# Patient Record
Sex: MALE | Race: WHITE | NOT HISPANIC OR LATINO | Employment: FULL TIME | URBAN - METROPOLITAN AREA
[De-identification: names, ages, dates, MRNs, and addresses within clinical notes are randomized per-mention and may not be internally consistent; named-entity substitution may affect disease eponyms.]

---

## 2023-03-16 LAB — ANTI-NUCLEAR ANTIBODY (ANA): NEGATIVE

## 2023-09-14 PROBLEM — H51.0 PALSY OF CONJUGATE GAZE: Status: ACTIVE | Noted: 2023-09-14

## 2023-09-14 PROBLEM — Z95.5 PRESENCE OF STENT IN CORONARY ARTERY: Status: ACTIVE | Noted: 2023-09-14

## 2023-09-14 PROBLEM — R13.10 DYSPHAGIA: Status: ACTIVE | Noted: 2023-09-14

## 2023-09-14 PROBLEM — R50.9 FEVER: Status: ACTIVE | Noted: 2023-09-14

## 2023-09-14 PROBLEM — H25.10 NUCLEAR SCLEROSIS: Status: ACTIVE | Noted: 2023-09-14

## 2023-09-14 PROBLEM — G93.89 CEREBRAL VENTRICULOMEGALY: Status: ACTIVE | Noted: 2023-09-14

## 2023-09-14 PROBLEM — G47.33 OSA (OBSTRUCTIVE SLEEP APNEA): Status: ACTIVE | Noted: 2023-09-14

## 2023-09-14 PROBLEM — R49.0 DYSPHONIA: Status: ACTIVE | Noted: 2023-09-14

## 2023-09-14 PROBLEM — R00.1 SINUS BRADYCARDIA: Status: ACTIVE | Noted: 2023-09-14

## 2023-09-14 PROBLEM — K11.7 SIALORRHEA: Status: ACTIVE | Noted: 2023-09-14

## 2023-09-14 PROBLEM — G95.9 CERVICAL MYELOPATHY (MULTI): Status: ACTIVE | Noted: 2023-09-14

## 2023-09-14 PROBLEM — I10 BENIGN HYPERTENSION: Status: ACTIVE | Noted: 2023-09-14

## 2023-09-14 PROBLEM — I25.10 CORONARY ARTERY DISEASE: Status: ACTIVE | Noted: 2023-09-14

## 2023-09-14 PROBLEM — R06.83 SNORING: Status: ACTIVE | Noted: 2023-09-14

## 2023-09-14 PROBLEM — G24.4 ORAL DYSKINESIA: Status: ACTIVE | Noted: 2023-09-14

## 2023-09-14 PROBLEM — E53.8 VITAMIN B12 DEFICIENCY: Status: ACTIVE | Noted: 2023-09-14

## 2023-09-14 PROBLEM — G91.9 HYDROCEPHALUS (MULTI): Status: ACTIVE | Noted: 2023-09-14

## 2023-09-14 PROBLEM — R09.89 RUNNY NOSE: Status: ACTIVE | Noted: 2023-09-14

## 2023-09-14 PROBLEM — R20.2 PARESTHESIA OF BOTH FEET: Status: ACTIVE | Noted: 2023-09-14

## 2023-09-14 PROBLEM — R26.81 UNSTEADY GAIT: Status: ACTIVE | Noted: 2023-09-14

## 2023-09-14 PROBLEM — R51.9 HEADACHE: Status: ACTIVE | Noted: 2023-09-14

## 2023-09-14 PROBLEM — R07.89 CHEST TIGHTNESS: Status: ACTIVE | Noted: 2023-09-14

## 2023-09-14 PROBLEM — G20.C PARKINSONISM (MULTI): Status: ACTIVE | Noted: 2023-09-14

## 2023-09-14 PROBLEM — E78.5 HYPERLIPIDEMIA: Status: ACTIVE | Noted: 2023-09-14

## 2023-09-14 PROBLEM — G47.19 EXCESSIVE DAYTIME SLEEPINESS: Status: ACTIVE | Noted: 2023-09-14

## 2023-09-14 RX ORDER — TRIAMCINOLONE ACETONIDE 1 MG/G
CREAM TOPICAL 2 TIMES DAILY
COMMUNITY
End: 2024-02-21

## 2023-09-14 RX ORDER — METOPROLOL SUCCINATE 50 MG/1
1 TABLET, EXTENDED RELEASE ORAL DAILY
COMMUNITY
Start: 2021-11-16 | End: 2024-02-21

## 2023-09-14 RX ORDER — METFORMIN HYDROCHLORIDE 500 MG/1
500 TABLET ORAL
COMMUNITY

## 2023-09-14 RX ORDER — MIRABEGRON 50 MG/1
1 TABLET, EXTENDED RELEASE ORAL DAILY
COMMUNITY
End: 2024-02-21

## 2023-09-14 RX ORDER — AMANTADINE 137 MG/1
CAPSULE, COATED PELLETS ORAL DAILY
COMMUNITY
Start: 2022-07-21 | End: 2024-04-23 | Stop reason: SDUPTHER

## 2023-09-14 RX ORDER — ATORVASTATIN CALCIUM 20 MG/1
1 TABLET, FILM COATED ORAL DAILY
COMMUNITY
Start: 2019-01-21

## 2023-09-14 RX ORDER — TRIAMTERENE/HYDROCHLOROTHIAZID 37.5-25 MG
TABLET ORAL
COMMUNITY
Start: 2018-11-16 | End: 2024-02-21

## 2023-09-14 RX ORDER — FOLIC ACID 1 MG/1
1 TABLET ORAL DAILY
COMMUNITY
End: 2024-03-21 | Stop reason: SDUPTHER

## 2023-09-14 RX ORDER — LOSARTAN POTASSIUM 50 MG/1
50 TABLET ORAL DAILY
COMMUNITY
End: 2024-05-21 | Stop reason: SDUPTHER

## 2023-09-14 RX ORDER — PREGABALIN 75 MG/1
CAPSULE ORAL 2 TIMES DAILY
COMMUNITY
Start: 2020-10-13

## 2023-09-14 RX ORDER — CYANOCOBALAMIN 1000 UG/ML
INJECTION, SOLUTION INTRAMUSCULAR; SUBCUTANEOUS
COMMUNITY

## 2023-09-14 RX ORDER — ONDANSETRON 4 MG/1
4 TABLET, ORALLY DISINTEGRATING ORAL EVERY 8 HOURS PRN
COMMUNITY

## 2023-09-14 RX ORDER — CARBIDOPA AND LEVODOPA 25; 100 MG/1; MG/1
TABLET ORAL
COMMUNITY
Start: 2021-06-08 | End: 2023-10-17 | Stop reason: ALTCHOICE

## 2023-10-17 ENCOUNTER — OFFICE VISIT (OUTPATIENT)
Dept: NEUROLOGY | Facility: CLINIC | Age: 63
End: 2023-10-17
Payer: COMMERCIAL

## 2023-10-17 VITALS
SYSTOLIC BLOOD PRESSURE: 134 MMHG | RESPIRATION RATE: 18 BRPM | TEMPERATURE: 98.2 F | WEIGHT: 168.2 LBS | HEIGHT: 69 IN | DIASTOLIC BLOOD PRESSURE: 83 MMHG | HEART RATE: 64 BPM | BODY MASS INDEX: 24.91 KG/M2

## 2023-10-17 DIAGNOSIS — F51.04 CHRONIC INSOMNIA: ICD-10-CM

## 2023-10-17 DIAGNOSIS — G20.C PRIMARY PARKINSONISM (MULTI): Primary | ICD-10-CM

## 2023-10-17 DIAGNOSIS — G91.2 IDIOPATHIC NORMAL PRESSURE HYDROCEPHALUS (MULTI): ICD-10-CM

## 2023-10-17 PROCEDURE — 3075F SYST BP GE 130 - 139MM HG: CPT | Performed by: PSYCHIATRY & NEUROLOGY

## 2023-10-17 PROCEDURE — 99214 OFFICE O/P EST MOD 30 MIN: CPT | Performed by: PSYCHIATRY & NEUROLOGY

## 2023-10-17 PROCEDURE — 1036F TOBACCO NON-USER: CPT | Performed by: PSYCHIATRY & NEUROLOGY

## 2023-10-17 PROCEDURE — 3079F DIAST BP 80-89 MM HG: CPT | Performed by: PSYCHIATRY & NEUROLOGY

## 2023-10-17 RX ORDER — MIRTAZAPINE 7.5 MG/1
7.5 TABLET, FILM COATED ORAL NIGHTLY
Qty: 30 TABLET | Refills: 0 | Status: SHIPPED | OUTPATIENT
Start: 2023-10-17 | End: 2023-11-14

## 2023-10-17 ASSESSMENT — ENCOUNTER SYMPTOMS
FEVER: 0
HEADACHES: 0
EYE PAIN: 0
HALLUCINATIONS: 0

## 2023-10-17 NOTE — PROGRESS NOTES
Subjective     Delfino Newberry is a 63 y.o. year old male here for PD and NPH follow up.     HPI  MRI brain showed worsening gliosis near his  shunt. HE is having nausea and periodic HAs.      Ropinirole was stopped due to a rash - that did not go away. Needed to see dermatologist after and put on a   Rytary 95mg 3 caps 6am/ 3 caps at noon/ 3 caps at 6pm. - gets at [facility].   He feels it helps a lot with his tongue dyskinesia and movement and speech.   Rarely notice it now.      Due to oral / tongue dyskinesia we stopped Sinemet (even on 1/2 tab TID) and started Ropinirole but the oral movements did not go away. He states he had a rash x 2 weeks, Ropinirole .   he has neuropathy - on Lyrica for this.  He gets b12 injections.    Gocovri 37mg 2 caps at night - helped dyskinesia. He has nausea.   He feels sinemet is not helping his movements.   past- he tried Amantadine - did not help. he tried 100mg once daily caused stomach issues.        He was on Sinemet 1 tab TID - initially had great response. then tried 1 tab QID no change in movement or his tongue dyskinesia. now taking 1/2 tab QID - no difference.      PMH complicated with HTN, DM2, CAD (on ASA daily LD 6/1), polyneuropathy, B12 deficiency, NPH (s/p RF VPS 6/2020w/ improvement, presented to [facility] obtunded 12/2020, s/p EVD placement, prolonged   hospital course, s/p RF VPS replacement- managed with Dr. De Jesus- seen by him few weeks ago and increased flow to the  shunt.     wife thinks the speech changes occurred after December and his shunt complication.      has tremors for since early 2020. occur at rest. L more than R hand.   admitted to [facility] for persistent confusion, drooling, and dysphagia.     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   seen by Ophthalmology-Dr. Heraclio Delatorre and ophtho fellow last week.       Neurology  resident and  evaluated him at hospital and recommended MOCA and SLP   evaluation and routine EEG. EEG was unremarkable.       neuropsych testing April 2019-showed a mixed picture. He did not have dramatic improvement after large volume LP. on ddx was LBD vs. FTLD.   wife states he has a flat affect but memory is pretty stable. walking is okay. no falls.   most noticeable recent changes are the speech and swallowing changes.      He first saw Dr. Alcazar in July 2018 and Feb 2019 for ELAINE and then neuropathy, had brain imaging then went to [facility] for evaluation for NPH.     MRI brain june 1 2021 showed repositioned R  shunt, showed aqueductal stenosis.       December 2019 he became obtunded /lethargic and was admitted to [facility] where he was found to have a shunt failure. He had a revision surgery and was discharged with gradual improvements and ability to ambulate independently but states that cognitively he never returned to baseline.   Since, he has had a slowly progressive decline in his ability to swallow. His wife states that he coughs after he eats and drinks, his voice is softer, having a lot of drooling      FH: mother had dementia in her 80s.   Review of Systems   Constitutional:  Negative for fever.   HENT:  Negative for ear pain.    Eyes:  Negative for pain.   Neurological:  Negative for syncope and headaches.   Psychiatric/Behavioral:  Negative for hallucinations and self-injury.    All other systems reviewed and are negative.    Patient Active Problem List   Diagnosis    Benign hypertension    Cerebral ventriculomegaly    Hydrocephalus (CMS/HCC)    Cervical myelopathy (CMS/HCC)    Chest tightness    Coronary artery disease    Dysphagia    Dysphonia    Nuclear sclerosis    Hyperlipidemia    Palsy of conjugate gaze    Paresthesia of both feet    Parkinsonism    Presence of stent in coronary artery    Sialorrhea    Excessive daytime sleepiness    Fever    Headache    ELAINE (obstructive sleep apnea)    Runny  nose    Sinus bradycardia    Snoring    Unsteady gait    Vitamin B12 deficiency    Oral dyskinesia     Past Medical History:   Diagnosis Date    (Idiopathic) normal pressure hydrocephalus (CMS/[facility]) 07/17/2021    NPH (normal pressure hydrocephalus)    Atherosclerotic heart disease of native coronary artery without angina pectoris 06/20/2022    Coronary artery disease    Disease of spinal cord, unspecified (CMS/[facility]) 07/15/2021    Cervical myelopathy    Obstructive sleep apnea (adult) (pediatric) 07/15/2021    Obstructive sleep apnea, adult    Obstructive sleep apnea (adult) (pediatric) 02/07/2019    ELAINE (obstructive sleep apnea)    Other hyperlipidemia 07/15/2021    Other hyperlipidemia    Parkinson's disease (CMS/[facility]) 11/29/2022    Parkinsonism    Personal history of other diseases of the circulatory system 07/15/2021    History of coronary artery disease    Personal history of other diseases of the circulatory system 07/15/2021    History of hypertension    Personal history of other diseases of the circulatory system     History of abnormal electrocardiography    Personal history of other diseases of the musculoskeletal system and connective tissue     History of backache    Personal history of other endocrine, nutritional and metabolic disease 07/15/2021    History of type 2 diabetes mellitus    Personal history of other specified conditions 06/01/2018    History of snoring    Personal history of other specified conditions 07/15/2021    History of chest pain     Past Surgical History:   Procedure Laterality Date    CARDIAC CATHETERIZATION  07/17/2021    Cardiac Cath Procedure Outcome:    OTHER SURGICAL HISTORY  07/17/2021    Ventriculoperitoneal shunt creation     Social History     Tobacco Use    Smoking status: Not on file    Smokeless tobacco: Not on file   Substance Use Topics    Alcohol use: Not on file     family history includes Coronary artery disease in his mother; Stroke in his father.    Current  Outpatient Medications:     amantadine HCL (Gocovri) 137 mg capsule,extended release 24hr, Take by mouth. Take per directed, Disp: , Rfl:     atorvastatin (Lipitor) 20 mg tablet, Take 1 tablet (20 mg) by mouth once daily., Disp: , Rfl:     carbidopa-levodopa (Sinemet)  mg tablet, Take by mouth. Take per directed, Disp: , Rfl:     cyanocobalamin (Vitamin B-12) 1,000 mcg/mL injection, Take per directed, Disp: , Rfl:     folic acid (Folvite) 1 mg tablet, Take 1 tablet (1 mg) by mouth once daily., Disp: , Rfl:     losartan (Cozaar) 50 mg tablet, Take 1 tablet (50 mg) by mouth once daily., Disp: , Rfl:     metFORMIN (Glucophage) 500 mg tablet, Take 1 tablet (500 mg) by mouth 2 times a day with meals., Disp: , Rfl:     metoprolol succinate XL (Toprol-XL) 50 mg 24 hr tablet, Take 1 tablet (50 mg) by mouth once daily., Disp: , Rfl:     mirabegron (Myrbetriq) 50 mg tablet extended release 24 hr 24 hr tablet, Take 1 tablet (50 mg) by mouth once daily., Disp: , Rfl:     ondansetron ODT (Zofran-ODT) 4 mg disintegrating tablet, Take 1 tablet (4 mg) by mouth every 8 hours if needed for nausea or vomiting., Disp: , Rfl:     pregabalin (Lyrica) 75 mg capsule, Take by mouth twice a day. Take per directed, Disp: , Rfl:     triamcinolone (Kenalog) 0.1 % cream, Apply topically 2 times a day. Take per directed, Disp: , Rfl:     triamterene-hydrochlorothiazid (Maxzide-25) 37.5-25 mg tablet, Take by mouth. Take per directed, Disp: , Rfl:   No Known Allergies  There were no vitals taken for this visit.  Neurological Exam/Physical Exam:    tongue / mouth dyskinesia- mild   Constitutional: General appearance: no acute distress   Auscultation of Heart: Regular rate and rhythm, no murmurs, normal S1 and S2.   Carotid Arteries: Intact without any bruits   Peripheral Vascular Exam: Pulses +2 and equal in all extremities. No swelling, varicosities, edema or tenderness to palpations   Mental status: The patient was in no distress, alert,  interactive and cooperative. Affect is appropriate.   Orientation: oriented to person, oriented to place and oriented to time.   Memory: recent memory intact and remote memory intact.   Attention: normal attention span and normal concentrating ability.   Language: normal comprehension and no difficulty naming common objects.   Fund of knowledge: Patient displays adequate knowledge of current events, adequate fund of knowledge regarding past history and adequate fund of knowledge regarding vocabulary. spastic speech, hypophonic. somewhat nasal at times.   Eyes: The ophthalmoscopic examination was normal. The fundi are visualized with normal disc margins and without hemorrhages   Cranial nerve II: Visual fields full to confrontation.   Cranial nerves III, IV, and VI: Abnormal . unable to look upgaze.   Cranial Nerve V: Facial sensation intact bilaterally.   Cranial nerve VII: Normal and symmetric facial strength.   Cranial nerve VIII: Hearing is intact bilaterally to finger rub / whisper.   Cranial nerves IX and X: Palate elevates symmetrically.   Cranial nerve XI: Shoulder shrug and neck rotation strength are intact.   Cranial nerve XII: Tongue midline with normal strength.   Motor: Motor exam was normal. Muscle bulk was normal in both upper and lower extremities. Abnormal. Muscle strength was 5/5 throughout. Abnormal. tongue / mouth dyskinesia. tremors of R hand at rest at times.. mild bradykinesia. mild rigidity in L >R side.   Deep Tendon Reflexes: left biceps 2+ , right biceps 2+, left triceps 2+, right triceps 2+, left brachioradialis 3+, right brachioradialis 3+, left patella 3+, right patella 3+, left ankle jerk 3+, right ankle jerk 3+   Plantar Reflex: Toes downgoing to plantar stimulation on the left. Toes downgoing to plantar stimulation on the right.   Sensory Exam: Sensory Exam was abnormal. dec vibration in feet b/l.   Coordination: There is no limb dystaxia and rapid alternating movements are intact.  "  Gait: Abnormal . mildly wide based gait.        Labs:  No components found for: \"THRYOIDSTIMU\"  CBC:   Lab Results   Component Value Date    WBC 5.3 06/01/2021    HGB 16.1 06/01/2021    HCT 44.5 06/01/2021     06/01/2021     BMP:   Lab Results   Component Value Date     (L) 06/01/2021    K 3.3 (L) 06/01/2021    CL 95 (L) 06/01/2021    CO2 26 06/01/2021    BUN 16 06/01/2021    CREATININE 1.19 06/01/2021    CALCIUM 10.1 06/01/2021     LFT:   Lab Results   Component Value Date    ALKPHOS 69 12/03/2020    BILITOT 1.0 12/03/2020    PROT 7.1 12/03/2020    ALBUMIN 4.6 12/03/2020    ALT 28 12/03/2020    AST 21 12/03/2020         Assessment/Plan   Problem List Items Addressed This Visit             ICD-10-CM    Hydrocephalus (CMS/[facility]) G91.9    Parkinsonism - Primary G20.C    with NPH, parkinsonism, speech/bulbar symptoms 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      he failed amantadine - could not tolerate.   rash was NOT due to ropinirole.  ? Never figured that out.   NPH - followed by NSGY.  c/w rytary 95mg 3 caps TID.  And gocovri.   Try remeron 7.5mg at bedtime for insomnia.    "

## 2023-10-17 NOTE — PATIENT INSTRUCTIONS
"It was a pleasure seeing you today.     Mirtazepine 7.5mg at bedtime for sleep ( can help anxiety)- try it for 1 month and see if it helps.     In general, the following behavioral strategies and environmental modifications can help improve duration and quality of sleep:   a. Set a fixed bedtime and waking time every day.   b. Avoid napping during the day.    c. Avoid alcohol, caffeine and heavy, spicy, or sugary foods 4-6 hours before bedtime.    d. Exercise regularly, but not right before going to bed.   e. Block out all distractions by turning off - or even removing from the room - electronic devices such as TV, computer, phone, video player, audio player, prakash device, etc.   f. Use comfortable bedding, set a comfortable temperature, and keep the room well ventilated.   g. Do not use the bed as an office, workroom, or recreation room.    h. Establish a pre-sleep ritual, such as a warm bath or a few minutes of reading.   i. If prone to anxiety, relaxation techniques such as yoga and deep breathing can be helpful.    Please make a follow up appointment in 6 months.     For any urgent issues or needing to speak to a medical assistant please call [phone], option 6 during our office hours Monday-Friday 8am-4pm, and leave a voicemail with your concern.  My office will try to reach back you as soon as possible within 24 (business) hours.  If you have an emergency please call 911 or visit a local urgent care or nearest emergency room.      Please understand that [employer] is a useful communication tool for simple \"normal\" results or a refill request but I would not recommend using this tool for emergent or urgent issues or for conversations with me.  I am happy to ask my staff to rearrange a follow up visit or a virtual visit sooner than requested if appropriate for your care.       "

## 2023-11-14 DIAGNOSIS — F51.04 CHRONIC INSOMNIA: ICD-10-CM

## 2023-11-14 RX ORDER — MIRTAZAPINE 7.5 MG/1
7.5 TABLET, FILM COATED ORAL NIGHTLY
Qty: 30 TABLET | Refills: 0 | Status: SHIPPED | OUTPATIENT
Start: 2023-11-14 | End: 2023-12-18

## 2023-12-17 DIAGNOSIS — F51.04 CHRONIC INSOMNIA: ICD-10-CM

## 2023-12-18 RX ORDER — MIRTAZAPINE 7.5 MG/1
7.5 TABLET, FILM COATED ORAL NIGHTLY
Qty: 30 TABLET | Refills: 0 | Status: SHIPPED | OUTPATIENT
Start: 2023-12-18 | End: 2024-02-21 | Stop reason: WASHOUT

## 2024-01-18 ENCOUNTER — TELEPHONE (OUTPATIENT)
Dept: PRIMARY CARE | Facility: CLINIC | Age: 64
End: 2024-01-18

## 2024-01-18 NOTE — TELEPHONE ENCOUNTER
Pt. Called & said Dr. Hedrick -he's his brother in law & said Dr. Hedrick said he could get in  With you.  Please advise.  Ty

## 2024-02-21 ENCOUNTER — OFFICE VISIT (OUTPATIENT)
Dept: PRIMARY CARE | Facility: CLINIC | Age: 64
End: 2024-02-21
Payer: COMMERCIAL

## 2024-02-21 VITALS
BODY MASS INDEX: 24.82 KG/M2 | OXYGEN SATURATION: 96 % | DIASTOLIC BLOOD PRESSURE: 74 MMHG | SYSTOLIC BLOOD PRESSURE: 124 MMHG | TEMPERATURE: 98.6 F | HEIGHT: 69 IN | WEIGHT: 167.6 LBS | HEART RATE: 71 BPM

## 2024-02-21 DIAGNOSIS — E55.9 VITAMIN D DEFICIENCY: ICD-10-CM

## 2024-02-21 DIAGNOSIS — E78.5 HYPERLIPIDEMIA, UNSPECIFIED HYPERLIPIDEMIA TYPE: ICD-10-CM

## 2024-02-21 DIAGNOSIS — R73.9 HYPERGLYCEMIA: ICD-10-CM

## 2024-02-21 DIAGNOSIS — Z79.899 HIGH RISK MEDICATION USE: ICD-10-CM

## 2024-02-21 DIAGNOSIS — Z11.59 ENCOUNTER FOR HEPATITIS C SCREENING TEST FOR LOW RISK PATIENT: ICD-10-CM

## 2024-02-21 DIAGNOSIS — Z00.00 ANNUAL PHYSICAL EXAM: Primary | ICD-10-CM

## 2024-02-21 DIAGNOSIS — E11.9 CONTROLLED TYPE 2 DIABETES MELLITUS WITHOUT COMPLICATION, WITHOUT LONG-TERM CURRENT USE OF INSULIN (MULTI): ICD-10-CM

## 2024-02-21 DIAGNOSIS — Z11.4 ENCOUNTER FOR SCREENING FOR HIV: ICD-10-CM

## 2024-02-21 DIAGNOSIS — I10 BENIGN ESSENTIAL HYPERTENSION: ICD-10-CM

## 2024-02-21 PROCEDURE — 3078F DIAST BP <80 MM HG: CPT | Performed by: FAMILY MEDICINE

## 2024-02-21 PROCEDURE — 99386 PREV VISIT NEW AGE 40-64: CPT | Performed by: FAMILY MEDICINE

## 2024-02-21 PROCEDURE — 90670 PCV13 VACCINE IM: CPT | Performed by: FAMILY MEDICINE

## 2024-02-21 PROCEDURE — 90750 HZV VACC RECOMBINANT IM: CPT | Performed by: FAMILY MEDICINE

## 2024-02-21 PROCEDURE — 90472 IMMUNIZATION ADMIN EACH ADD: CPT | Performed by: FAMILY MEDICINE

## 2024-02-21 PROCEDURE — 1036F TOBACCO NON-USER: CPT | Performed by: FAMILY MEDICINE

## 2024-02-21 PROCEDURE — 4010F ACE/ARB THERAPY RXD/TAKEN: CPT | Performed by: FAMILY MEDICINE

## 2024-02-21 PROCEDURE — 3074F SYST BP LT 130 MM HG: CPT | Performed by: FAMILY MEDICINE

## 2024-02-21 PROCEDURE — 90471 IMMUNIZATION ADMIN: CPT | Performed by: FAMILY MEDICINE

## 2024-02-21 RX ORDER — ASPIRIN 81 MG/1
81 TABLET ORAL DAILY
COMMUNITY

## 2024-02-21 RX ORDER — METOPROLOL SUCCINATE 25 MG/1
25 TABLET, EXTENDED RELEASE ORAL DAILY
COMMUNITY
Start: 2024-01-24

## 2024-02-21 RX ORDER — LEVODOPA AND CARBIDOPA 95; 23.75 MG/1; MG/1
3 CAPSULE, EXTENDED RELEASE ORAL 3 TIMES DAILY
COMMUNITY

## 2024-02-21 SDOH — HEALTH STABILITY: PHYSICAL HEALTH: ON AVERAGE, HOW MANY MINUTES DO YOU ENGAGE IN EXERCISE AT THIS LEVEL?: 100 MIN

## 2024-02-21 SDOH — HEALTH STABILITY: PHYSICAL HEALTH: ON AVERAGE, HOW MANY DAYS PER WEEK DO YOU ENGAGE IN MODERATE TO STRENUOUS EXERCISE (LIKE A BRISK WALK)?: 5 DAYS

## 2024-02-21 ASSESSMENT — PAIN SCALES - GENERAL: PAINLEVEL: 0-NO PAIN

## 2024-02-21 ASSESSMENT — ENCOUNTER SYMPTOMS: DEPRESSION: 0

## 2024-02-21 NOTE — PROGRESS NOTES
"Subjective   Patient ID: Delfino Newberry is a 63 y.o. male who presents for Annual Exam (Annual physical exam, pt is not fasting. ) and New Patient Visit (Establish care with pcp. ).    HPI   Patient here to establish and get an annual physical.  Significant past history for NPH with shunt, Parkinson's, CAD with stent as well as hypertension and hyperlipidemia.  Patient also on home IM injections of B12 for deficiency.  Lives in [place].  .  3 KIDS. 2 GRANDKIDS.  Works at [employer].  Walks and is physical all dAY  vIT b12   Review of Systems  Cardiovascular: no chest pain, no edema, no palpitations, and no syncope.   Respiratory: no cough,no shortness of breath, and no wheezing  Gastrointestinal: no abdominal pain but gets cramps, no nausea, vomiting or blood in stools  Genitourinary: no dysuria or hematuria    Objective   /74 (BP Location: Left arm, Patient Position: Sitting, BP Cuff Size: Adult)   Pulse 71   Temp 37 °C (98.6 °F) (Temporal)   Ht 1.753 m (5' 9\")   Wt 76 kg (167 lb 9.6 oz)   SpO2 96%   BMI 24.75 kg/m²     Physical Exam  Alert, pleasant and in no acute distress.  Neck: Supple, no JVD or carotid bruit  Heart: Regular rate and rhythm, no murmur  Lungs: Clear to auscultation  Lower extremities: No edema  Abdomen: Soft, nontender without palpable mass    Assessment/Plan   Problem List Items Addressed This Visit             ICD-10-CM       Cardiac and Vasculature    Hyperlipidemia E78.5    Relevant Orders    Lipid Panel     Other Visit Diagnoses         Codes    Annual physical exam    -  Primary Z00.00    Relevant Orders    Comprehensive Metabolic Panel    CBC    Lipid Panel    Vitamin D 25-Hydroxy,Total (for eval of Vitamin D levels)    Hemoglobin A1C    HIV 1/2 Antigen/Antibody Screen with Reflex to Confirmation    Hepatitis C antibody    Controlled type 2 diabetes mellitus without complication, without long-term current use of insulin (CMS/[facility])     E11.9    Benign essential " hypertension     I10    Relevant Orders    Comprehensive Metabolic Panel    CBC    Hyperglycemia     R73.9    Relevant Orders    Comprehensive Metabolic Panel    CBC    Hemoglobin A1C    Vitamin D deficiency     E55.9    Relevant Orders    Vitamin D 25-Hydroxy,Total (for eval of Vitamin D levels)    Encounter for hepatitis C screening test for low risk patient     Z11.59    Relevant Orders    Hepatitis C antibody    High risk medication use     Z79.899    Relevant Orders    Comprehensive Metabolic Panel    CBC    Encounter for screening for HIV     Z11.4    Relevant Orders    HIV 1/2 Antigen/Antibody Screen with Reflex to Confirmation        1.  Health maintenance: Care gaps addressed.  Shingles and pneumonia vaccines provided.  2.  Diabetes mellitus type 2:  Decrease metformin to 1/2 tab 2 times daily to see if this helps abdominal cramps.  Will plan to recheck an A1c in 3 to 4 months.  3.  Hypertension/hyperlipidemia: We will get routine labs.  Blood pressure under good control  4.  Vitamin D deficiency: We will recheck vitamin D  Follow-up in 3 to 4 months.  Call with any problems.

## 2024-02-24 ENCOUNTER — LAB (OUTPATIENT)
Dept: LAB | Facility: LAB | Age: 64
End: 2024-02-24
Payer: COMMERCIAL

## 2024-02-24 DIAGNOSIS — R73.9 HYPERGLYCEMIA: ICD-10-CM

## 2024-02-24 DIAGNOSIS — Z11.4 ENCOUNTER FOR SCREENING FOR HIV: ICD-10-CM

## 2024-02-24 DIAGNOSIS — Z11.59 ENCOUNTER FOR HEPATITIS C SCREENING TEST FOR LOW RISK PATIENT: ICD-10-CM

## 2024-02-24 DIAGNOSIS — E78.5 HYPERLIPIDEMIA, UNSPECIFIED HYPERLIPIDEMIA TYPE: ICD-10-CM

## 2024-02-24 DIAGNOSIS — Z00.00 ANNUAL PHYSICAL EXAM: ICD-10-CM

## 2024-02-24 DIAGNOSIS — Z79.899 HIGH RISK MEDICATION USE: ICD-10-CM

## 2024-02-24 DIAGNOSIS — I10 BENIGN ESSENTIAL HYPERTENSION: ICD-10-CM

## 2024-02-24 DIAGNOSIS — D64.9 ANEMIA, UNSPECIFIED TYPE: ICD-10-CM

## 2024-02-24 DIAGNOSIS — E55.9 VITAMIN D DEFICIENCY: ICD-10-CM

## 2024-02-24 LAB
25(OH)D3 SERPL-MCNC: 21 NG/ML (ref 30–100)
ALBUMIN SERPL BCP-MCNC: 4 G/DL (ref 3.4–5)
ALP SERPL-CCNC: 80 U/L (ref 33–136)
ALT SERPL W P-5'-P-CCNC: 12 U/L (ref 10–52)
ANION GAP SERPL CALC-SCNC: 12 MMOL/L (ref 10–20)
AST SERPL W P-5'-P-CCNC: 17 U/L (ref 9–39)
BILIRUB SERPL-MCNC: 0.8 MG/DL (ref 0–1.2)
BUN SERPL-MCNC: 20 MG/DL (ref 6–23)
CALCIUM SERPL-MCNC: 9.1 MG/DL (ref 8.6–10.3)
CHLORIDE SERPL-SCNC: 104 MMOL/L (ref 98–107)
CHOLEST SERPL-MCNC: 100 MG/DL (ref 0–199)
CHOLESTEROL/HDL RATIO: 2.6
CO2 SERPL-SCNC: 30 MMOL/L (ref 21–32)
CREAT SERPL-MCNC: 1.29 MG/DL (ref 0.5–1.3)
EGFRCR SERPLBLD CKD-EPI 2021: 62 ML/MIN/1.73M*2
ERYTHROCYTE [DISTWIDTH] IN BLOOD BY AUTOMATED COUNT: 12.2 % (ref 11.5–14.5)
EST. AVERAGE GLUCOSE BLD GHB EST-MCNC: 111 MG/DL
GLUCOSE SERPL-MCNC: 108 MG/DL (ref 74–99)
HBA1C MFR BLD: 5.5 %
HCT VFR BLD AUTO: 36 % (ref 41–52)
HCV AB SER QL: NONREACTIVE
HDLC SERPL-MCNC: 38.4 MG/DL
HGB BLD-MCNC: 12 G/DL (ref 13.5–17.5)
HIV 1+2 AB+HIV1 P24 AG SERPL QL IA: NONREACTIVE
LDLC SERPL CALC-MCNC: 52 MG/DL
MCH RBC QN AUTO: 31.1 PG (ref 26–34)
MCHC RBC AUTO-ENTMCNC: 33.3 G/DL (ref 32–36)
MCV RBC AUTO: 93 FL (ref 80–100)
NON HDL CHOLESTEROL: 62 MG/DL (ref 0–149)
NRBC BLD-RTO: 0 /100 WBCS (ref 0–0)
PLATELET # BLD AUTO: 265 X10*3/UL (ref 150–450)
POTASSIUM SERPL-SCNC: 4.5 MMOL/L (ref 3.5–5.3)
PROT SERPL-MCNC: 6.2 G/DL (ref 6.4–8.2)
RBC # BLD AUTO: 3.86 X10*6/UL (ref 4.5–5.9)
SODIUM SERPL-SCNC: 141 MMOL/L (ref 136–145)
TRIGL SERPL-MCNC: 46 MG/DL (ref 0–149)
VLDL: 9 MG/DL (ref 0–40)
WBC # BLD AUTO: 3.1 X10*3/UL (ref 4.4–11.3)

## 2024-02-24 PROCEDURE — 82746 ASSAY OF FOLIC ACID SERUM: CPT

## 2024-02-24 PROCEDURE — 80061 LIPID PANEL: CPT

## 2024-02-24 PROCEDURE — 36415 COLL VENOUS BLD VENIPUNCTURE: CPT

## 2024-02-24 PROCEDURE — 83036 HEMOGLOBIN GLYCOSYLATED A1C: CPT

## 2024-02-24 PROCEDURE — 82728 ASSAY OF FERRITIN: CPT

## 2024-02-24 PROCEDURE — 83550 IRON BINDING TEST: CPT

## 2024-02-24 PROCEDURE — 82306 VITAMIN D 25 HYDROXY: CPT

## 2024-02-24 PROCEDURE — 85027 COMPLETE CBC AUTOMATED: CPT

## 2024-02-24 PROCEDURE — 82607 VITAMIN B-12: CPT

## 2024-02-24 PROCEDURE — 84443 ASSAY THYROID STIM HORMONE: CPT

## 2024-02-24 PROCEDURE — 86803 HEPATITIS C AB TEST: CPT

## 2024-02-24 PROCEDURE — 83540 ASSAY OF IRON: CPT

## 2024-02-24 PROCEDURE — 80053 COMPREHEN METABOLIC PANEL: CPT

## 2024-02-24 PROCEDURE — 87389 HIV-1 AG W/HIV-1&-2 AB AG IA: CPT

## 2024-02-25 DIAGNOSIS — D64.9 ANEMIA, UNSPECIFIED TYPE: Primary | ICD-10-CM

## 2024-02-25 LAB
FERRITIN SERPL-MCNC: 258 NG/ML (ref 20–300)
FOLATE SERPL-MCNC: >22.3 NG/ML
IRON SATN MFR SERPL: 30 % (ref 25–45)
IRON SERPL-MCNC: 95 UG/DL (ref 35–150)
TIBC SERPL-MCNC: 315 UG/DL (ref 240–445)
TSH SERPL-ACNC: 0.85 MIU/L (ref 0.44–3.98)
UIBC SERPL-MCNC: 220 UG/DL (ref 110–370)
VIT B12 SERPL-MCNC: 352 PG/ML (ref 211–911)

## 2024-02-27 DIAGNOSIS — D72.819 LEUKOPENIA, UNSPECIFIED TYPE: ICD-10-CM

## 2024-02-27 DIAGNOSIS — D64.9 ANEMIA, UNSPECIFIED TYPE: Primary | ICD-10-CM

## 2024-03-21 DIAGNOSIS — E11.9 CONTROLLED TYPE 2 DIABETES MELLITUS WITHOUT COMPLICATION, WITHOUT LONG-TERM CURRENT USE OF INSULIN (MULTI): Primary | ICD-10-CM

## 2024-03-21 DIAGNOSIS — F51.01 PRIMARY INSOMNIA: ICD-10-CM

## 2024-03-21 RX ORDER — FOLIC ACID 1 MG/1
1 TABLET ORAL DAILY
Qty: 90 TABLET | Refills: 3 | Status: SHIPPED | OUTPATIENT
Start: 2024-03-21 | End: 2025-03-21

## 2024-03-21 RX ORDER — TRAZODONE HYDROCHLORIDE 50 MG/1
50 TABLET ORAL NIGHTLY PRN
Qty: 30 TABLET | Refills: 5 | Status: SHIPPED | OUTPATIENT
Start: 2024-03-21 | End: 2024-05-13

## 2024-04-23 ENCOUNTER — LAB (OUTPATIENT)
Dept: LAB | Facility: CLINIC | Age: 64
End: 2024-04-23
Payer: COMMERCIAL

## 2024-04-23 ENCOUNTER — OFFICE VISIT (OUTPATIENT)
Dept: NEUROLOGY | Facility: CLINIC | Age: 64
End: 2024-04-23
Payer: COMMERCIAL

## 2024-04-23 ENCOUNTER — OFFICE VISIT (OUTPATIENT)
Dept: HEMATOLOGY/ONCOLOGY | Facility: CLINIC | Age: 64
End: 2024-04-23
Payer: COMMERCIAL

## 2024-04-23 VITALS
WEIGHT: 163.8 LBS | SYSTOLIC BLOOD PRESSURE: 121 MMHG | HEIGHT: 68 IN | BODY MASS INDEX: 24.83 KG/M2 | DIASTOLIC BLOOD PRESSURE: 71 MMHG | HEART RATE: 49 BPM

## 2024-04-23 VITALS
HEART RATE: 56 BPM | WEIGHT: 162.7 LBS | OXYGEN SATURATION: 97 % | RESPIRATION RATE: 20 BRPM | TEMPERATURE: 97.5 F | SYSTOLIC BLOOD PRESSURE: 123 MMHG | BODY MASS INDEX: 26.15 KG/M2 | HEIGHT: 66 IN | DIASTOLIC BLOOD PRESSURE: 75 MMHG

## 2024-04-23 DIAGNOSIS — G20.A1 PARKINSON'S DISEASE WITHOUT DYSKINESIA, UNSPECIFIED WHETHER MANIFESTATIONS FLUCTUATE (MULTI): Primary | ICD-10-CM

## 2024-04-23 DIAGNOSIS — D64.9 ANEMIA, UNSPECIFIED TYPE: ICD-10-CM

## 2024-04-23 DIAGNOSIS — D72.819 LEUKOPENIA, UNSPECIFIED TYPE: ICD-10-CM

## 2024-04-23 DIAGNOSIS — D64.9 ANEMIA, UNSPECIFIED TYPE: Primary | ICD-10-CM

## 2024-04-23 LAB
ALBUMIN SERPL BCP-MCNC: 4.5 G/DL (ref 3.4–5)
ALP SERPL-CCNC: 61 U/L (ref 33–136)
ALT SERPL W P-5'-P-CCNC: 7 U/L (ref 10–52)
ANION GAP SERPL CALC-SCNC: 11 MMOL/L (ref 10–20)
AST SERPL W P-5'-P-CCNC: 14 U/L (ref 9–39)
BASOPHILS # BLD AUTO: 0.02 X10*3/UL (ref 0–0.1)
BASOPHILS NFR BLD AUTO: 0.8 %
BILIRUB SERPL-MCNC: 1.1 MG/DL (ref 0–1.2)
BUN SERPL-MCNC: 22 MG/DL (ref 6–23)
CALCIUM SERPL-MCNC: 9.2 MG/DL (ref 8.6–10.3)
CHLORIDE SERPL-SCNC: 107 MMOL/L (ref 98–107)
CO2 SERPL-SCNC: 26 MMOL/L (ref 21–32)
CREAT SERPL-MCNC: 1.31 MG/DL (ref 0.5–1.3)
DAT-POLYSPECIFIC: NORMAL
EGFRCR SERPLBLD CKD-EPI 2021: 61 ML/MIN/1.73M*2
EOSINOPHIL # BLD AUTO: 0.06 X10*3/UL (ref 0–0.7)
EOSINOPHIL NFR BLD AUTO: 2.4 %
ERYTHROCYTE [DISTWIDTH] IN BLOOD BY AUTOMATED COUNT: 12.7 % (ref 11.5–14.5)
GLUCOSE SERPL-MCNC: 87 MG/DL (ref 74–99)
HCT VFR BLD AUTO: 39.9 % (ref 41–52)
HGB BLD-MCNC: 13.5 G/DL (ref 13.5–17.5)
HGB RETIC QN: 35 PG (ref 28–38)
IMM GRANULOCYTES # BLD AUTO: 0.01 X10*3/UL (ref 0–0.7)
IMM GRANULOCYTES NFR BLD AUTO: 0.4 % (ref 0–0.9)
IMMATURE RETIC FRACTION: 7.1 %
LDH SERPL L TO P-CCNC: 157 U/L (ref 84–246)
LYMPHOCYTES # BLD AUTO: 0.76 X10*3/UL (ref 1.2–4.8)
LYMPHOCYTES NFR BLD AUTO: 30.2 %
MCH RBC QN AUTO: 31.9 PG (ref 26–34)
MCHC RBC AUTO-ENTMCNC: 33.8 G/DL (ref 32–36)
MCV RBC AUTO: 94 FL (ref 80–100)
MONOCYTES # BLD AUTO: 0.37 X10*3/UL (ref 0.1–1)
MONOCYTES NFR BLD AUTO: 14.7 %
NEUTROPHILS # BLD AUTO: 1.3 X10*3/UL (ref 1.2–7.7)
NEUTROPHILS NFR BLD AUTO: 51.5 %
NRBC BLD-RTO: 0 /100 WBCS (ref 0–0)
PLATELET # BLD AUTO: 162 X10*3/UL (ref 150–450)
POTASSIUM SERPL-SCNC: 4.2 MMOL/L (ref 3.5–5.3)
PROT SERPL-MCNC: 6.5 G/DL (ref 6.4–8.2)
PROT SERPL-MCNC: 6.7 G/DL (ref 6.4–8.2)
RBC # BLD AUTO: 4.23 X10*6/UL (ref 4.5–5.9)
RETICS #: 0.06 X10*6/UL (ref 0.02–0.12)
RETICS/RBC NFR AUTO: 1.5 % (ref 0.5–2)
SODIUM SERPL-SCNC: 140 MMOL/L (ref 136–145)
TSH SERPL-ACNC: 0.99 MIU/L (ref 0.44–3.98)
WBC # BLD AUTO: 2.5 X10*3/UL (ref 4.4–11.3)

## 2024-04-23 PROCEDURE — 86880 COOMBS TEST DIRECT: CPT

## 2024-04-23 PROCEDURE — 3074F SYST BP LT 130 MM HG: CPT | Performed by: PSYCHIATRY & NEUROLOGY

## 2024-04-23 PROCEDURE — 3074F SYST BP LT 130 MM HG: CPT | Performed by: INTERNAL MEDICINE

## 2024-04-23 PROCEDURE — 84443 ASSAY THYROID STIM HORMONE: CPT

## 2024-04-23 PROCEDURE — 85045 AUTOMATED RETICULOCYTE COUNT: CPT

## 2024-04-23 PROCEDURE — 99215 OFFICE O/P EST HI 40 MIN: CPT | Performed by: INTERNAL MEDICINE

## 2024-04-23 PROCEDURE — 83615 LACTATE (LD) (LDH) ENZYME: CPT

## 2024-04-23 PROCEDURE — 3078F DIAST BP <80 MM HG: CPT | Performed by: INTERNAL MEDICINE

## 2024-04-23 PROCEDURE — 36415 COLL VENOUS BLD VENIPUNCTURE: CPT

## 2024-04-23 PROCEDURE — 1036F TOBACCO NON-USER: CPT | Performed by: PSYCHIATRY & NEUROLOGY

## 2024-04-23 PROCEDURE — 84155 ASSAY OF PROTEIN SERUM: CPT | Mod: 59,PARLAB

## 2024-04-23 PROCEDURE — 83010 ASSAY OF HAPTOGLOBIN QUANT: CPT

## 2024-04-23 PROCEDURE — 86334 IMMUNOFIX E-PHORESIS SERUM: CPT | Mod: PARLAB

## 2024-04-23 PROCEDURE — 85025 COMPLETE CBC W/AUTO DIFF WBC: CPT

## 2024-04-23 PROCEDURE — 3078F DIAST BP <80 MM HG: CPT | Performed by: PSYCHIATRY & NEUROLOGY

## 2024-04-23 PROCEDURE — 99214 OFFICE O/P EST MOD 30 MIN: CPT | Performed by: PSYCHIATRY & NEUROLOGY

## 2024-04-23 PROCEDURE — G2211 COMPLEX E/M VISIT ADD ON: HCPCS | Performed by: PSYCHIATRY & NEUROLOGY

## 2024-04-23 PROCEDURE — 86320 SERUM IMMUNOELECTROPHORESIS: CPT | Performed by: PATHOLOGY

## 2024-04-23 PROCEDURE — 86038 ANTINUCLEAR ANTIBODIES: CPT | Mod: PARLAB

## 2024-04-23 PROCEDURE — 82668 ASSAY OF ERYTHROPOIETIN: CPT

## 2024-04-23 PROCEDURE — 84075 ASSAY ALKALINE PHOSPHATASE: CPT

## 2024-04-23 PROCEDURE — 84165 PROTEIN E-PHORESIS SERUM: CPT | Performed by: PATHOLOGY

## 2024-04-23 RX ORDER — AMANTADINE 137 MG/1
CAPSULE, COATED PELLETS ORAL
Qty: 60 CAPSULE | Refills: 11 | Status: SHIPPED | OUTPATIENT
Start: 2024-04-23

## 2024-04-23 ASSESSMENT — PATIENT HEALTH QUESTIONNAIRE - PHQ9
SUM OF ALL RESPONSES TO PHQ9 QUESTIONS 1 AND 2: 0
2. FEELING DOWN, DEPRESSED OR HOPELESS: NOT AT ALL
1. LITTLE INTEREST OR PLEASURE IN DOING THINGS: NOT AT ALL

## 2024-04-23 ASSESSMENT — ENCOUNTER SYMPTOMS
OCCASIONAL FEELINGS OF UNSTEADINESS: 0
LOSS OF SENSATION IN FEET: 0
DEPRESSION: 0

## 2024-04-23 ASSESSMENT — PAIN SCALES - GENERAL: PAINLEVEL: 0-NO PAIN

## 2024-04-23 NOTE — PROGRESS NOTES
Patient ID: : Delfino Newebrry            Primary Care Provider: Heraclio Crespo DO    LOCATION:  [facility]    REFERRING PHYSICIAN:  Heraclio Crespo DO    HEMATOLOGY ONCOLOGY PROBLEMS:  Anemia and leukopenia    CHIEF COMPLAINTS:  Patient is in the clinic today for evaluation of low WBC and RBC counts    HISTORY:  Delfino Newberry is a 63 y.o. male with multiple medical problem who has also been noted to have normocytic anemia.  Blood work from 2024 showed a hemoglobin of 12 with MCV of 93.  WBC was 3.1 with platelet of 265 K.  Extensive hematological workup done by Dr. Crespo revealed normal vitamin B12, folate, ferritin, iron panel, TSH etc.  Review of the previous blood work revealed normal hemoglobin ranging from 15-16 range over the last many years.  WBC were also normal ranging from 4-6 over the years.    INTERVAL HISTORY:  Patient denies any new complaints other than issues with generalized weakness and fatigue.  There is also some concern regarding issues with upset stomach and some weight loss etc.  As per patient he had a complete GI evaluation done recently.  He also has chronic issues with insomnia.  Of note there is also history of Parkinson disease and normal pressure hydrocephalus.  He has a  shunt placed which is being managed by neurosurgery team.    PAST MEDICAL HISTORY:  1.  Hypertension  2.  Diabetes mellitus type 2  3.  Parkinson's disease/hydrocephalus.  S/p  shunt  4.  Chronic insomnia  5.  Hyperlipidemia  6.  Coronary artery disease  7.  Peripheral neuropathy  8.  Vitamin D deficiency  9.  Obstructive sleep apnea  10.  History of cataract surgery    SOCIAL HISTORY:   for 32 years and lives in [place].  Non-smoker.  Social alcohol intake.  He work in sales for [employer].  Born and raised in [place].    FAMILY HISTORY:  Father  at age 92 from natural causes.  Mother  at age 88 from natural causes.  10 siblings.  1  "brother  from brain tumor at age 55.  He has 3 daughters all alive and well.  No other family history of bleeding, clotting or malignant disorders in the family history.    REVIEW OF SYSTEMS:   Pertinent finding as per the history above.  All other systems have been reviewed and generally negative and noncontributory.    VITAL SIGNS  BSA: 1.85 meters squared  /75   Pulse 56   Temp 36.4 °C (97.5 °F) (Temporal)   Resp 20   Ht 1.673 m (5' 5.87\")   Wt 73.8 kg (162 lb 11.2 oz)   SpO2 97%   BMI 26.37 kg/m²     PHYSICAL EXAMINATION:  Detailed physical examination not done.    LAB DATA:  Recent blood work results were reviewed and have been detailed in the history above.    ASSESSMENT / PLAN:  1.  Anemia/leukopenia.  No clear etiology.  Please refer to the details as outlined above. In summary, patient with multiple medical problem who has also been noted to have fairly sudden onset of leukopenia and anemia as detailed above.  Extensive hematological workup has already been completed by Dr. Crespo and was essentially unremarkable as detailed above.    Long discussion with the patient and his wife and they were explained blood count could be secondary to his other medical problems, medication effects or transient infection etc.  The possibility of a primary clonal disorder is less likely.  I will try to complete the initial hematological workup and will recheck a CBC along with metabolic profile, SAI, erythropoietin level, Philomena test, SPEP etc. Today.  Depending upon the results of initial workup there is a possibility that he may also need bone marrow biopsy in the future.    A total of 60 minutes were spent in evaluation - performing physical examination, history taking, review of the previous extensive records/information and formulating current and future management plan. Majority of the time was spend in consultation and discussion.    This note has been transcribed using Dragon voice recognition " system and there is a possibility of unintentional typing misprints.

## 2024-04-23 NOTE — PROGRESS NOTES
Subjective     Delfino Newberry is a 63 y.o. year old male here for PD and NPH follow up.     HPI  MRI brain showed worsening gliosis near his  shunt. He is having nausea and periodic HAs.    He saw Dr. Bergeron-   Ropinirole was stopped due to a rash - turned out not to be due to Ropinirole.    Current PD meds:  Rytary 95mg 3 caps 6am/ 3 caps at noon/ 3 caps at 6pm.   He feels that Rytary helps a lot with his tongue dyskinesia and movement and speech.   Rarely notice it now.    He has sleeping issues, tried Ambien.   Trazodone helps somewhat.   Due to oral / tongue dyskinesia he stopped Sinemet (even on 1/2 tab TID) and started Ropinirole but the oral movements did not go away.  WBC 3.1 , now 2.5 .  Neuropathy- he takes lyrica as well as b12 injections.    Gocovri 37mg 2 caps at night - helped dyskinesia.      past- he tried Amantadine - did not help. he tried 100mg once daily caused stomach issues.      He was on Sinemet 1 tab TID - initially had great response.      PMH complicated with HTN, DM2, CAD (on ASA daily LD 6/1), polyneuropathy, B12 deficiency, NPH (s/p RF VPS 6/2020w/ improvement, presented to [facility] obtunded 12/2020, s/p EVD placement, prolonged   hospital course, s/p RF VPS replacement- managed with Dr. De Jesus- seen by him few weeks ago and increased flow to the  shunt.     wife thinks the speech changes occurred after December and his shunt complication.      has tremors for since early 2020. occur at rest. L more than R hand.   admitted to [facility] for persistent confusion, drooling, and dysphagia.     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   seen by Ophthalmology-Dr. Heraclio Delatorre and ophtho fellow last week.       Neurology resident and DrKhloe evaluated him at hospital and recommended MOCA and SLP   evaluation and routine EEG. EEG was unremarkable.       neuropsych  testing April 2019-showed a mixed picture. He did not have dramatic improvement after large volume LP. on ddx was LBD vs. FTLD.   wife states he has a flat affect but memory is pretty stable. walking is okay. no falls.   most noticeable recent changes are the speech and swallowing changes.      He first saw Dr. Alcazar in July 2018 and Feb 2019 for ELAINE and then neuropathy, had brain imaging then went to [facility] for evaluation for NPH.     MRI brain june 1 2021 showed repositioned R  shunt, showed aqueductal stenosis.       December 2019 he became obtunded /lethargic and was admitted to [facility] where he was found to have a shunt failure. He had a revision surgery and was discharged with gradual improvements and ability to ambulate independently but states that cognitively he never returned to baseline.   Since, he has had a slowly progressive decline in his ability to swallow. His wife states that he coughs after he eats and drinks, his voice is softer, having a lot of drooling      FH: mother had dementia in her 80s.   Review of Systems   Constitutional:  Negative for fever.   HENT:  Negative for ear pain.    Eyes:  Negative for pain.   Neurological:  Negative for syncope and headaches.   Psychiatric/Behavioral:  Negative for hallucinations and self-injury.    All other systems reviewed and are negative.    Patient Active Problem List   Diagnosis    Benign hypertension    Cerebral ventriculomegaly    Hydrocephalus (CMS/HCC)    Cervical myelopathy (CMS/HCC)    Chest tightness    Coronary artery disease    Dysphagia    Dysphonia    Nuclear sclerosis    Hyperlipidemia    Palsy of conjugate gaze    Paresthesia of both feet    Parkinsonism    Presence of stent in coronary artery    Sialorrhea    Excessive daytime sleepiness    Fever    Headache    ELAINE (obstructive sleep apnea)    Runny nose    Sinus bradycardia    Snoring    Unsteady gait    Vitamin B12 deficiency    Oral dyskinesia     Past Medical History:   Diagnosis Date     (Idiopathic) normal pressure hydrocephalus (CMS/[facility]) 07/17/2021    NPH (normal pressure hydrocephalus)    Atherosclerotic heart disease of native coronary artery without angina pectoris 06/20/2022    Coronary artery disease    Disease of spinal cord, unspecified (CMS/[facility]) 07/15/2021    Cervical myelopathy    Obstructive sleep apnea (adult) (pediatric) 07/15/2021    Obstructive sleep apnea, adult    Obstructive sleep apnea (adult) (pediatric) 02/07/2019    ELAINE (obstructive sleep apnea)    Other hyperlipidemia 07/15/2021    Other hyperlipidemia    Parkinson's disease (CMS/[facility]) 11/29/2022    Parkinsonism    Personal history of other diseases of the circulatory system 07/15/2021    History of coronary artery disease    Personal history of other diseases of the circulatory system 07/15/2021    History of hypertension    Personal history of other diseases of the circulatory system     History of abnormal electrocardiography    Personal history of other diseases of the musculoskeletal system and connective tissue     History of backache    Personal history of other endocrine, nutritional and metabolic disease 07/15/2021    History of type 2 diabetes mellitus    Personal history of other specified conditions 06/01/2018    History of snoring    Personal history of other specified conditions 07/15/2021    History of chest pain     Past Surgical History:   Procedure Laterality Date    CARDIAC CATHETERIZATION  07/17/2021    Cardiac Cath Procedure Outcome:    OTHER SURGICAL HISTORY  07/17/2021    Ventriculoperitoneal shunt creation     Social History     Tobacco Use    Smoking status: Not on file    Smokeless tobacco: Not on file   Substance Use Topics    Alcohol use: Not on file     family history includes Coronary artery disease in his mother; Stroke in his father.    Current Outpatient Medications:     amantadine HCL (Gocovri) 137 mg capsule,extended release 24hr, Take by mouth. Take per directed, Disp: , Rfl:      atorvastatin (Lipitor) 20 mg tablet, Take 1 tablet (20 mg) by mouth once daily., Disp: , Rfl:     carbidopa-levodopa (Sinemet)  mg tablet, Take by mouth. Take per directed, Disp: , Rfl:     cyanocobalamin (Vitamin B-12) 1,000 mcg/mL injection, Take per directed, Disp: , Rfl:     folic acid (Folvite) 1 mg tablet, Take 1 tablet (1 mg) by mouth once daily., Disp: , Rfl:     losartan (Cozaar) 50 mg tablet, Take 1 tablet (50 mg) by mouth once daily., Disp: , Rfl:     metFORMIN (Glucophage) 500 mg tablet, Take 1 tablet (500 mg) by mouth 2 times a day with meals., Disp: , Rfl:     metoprolol succinate XL (Toprol-XL) 50 mg 24 hr tablet, Take 1 tablet (50 mg) by mouth once daily., Disp: , Rfl:     mirabegron (Myrbetriq) 50 mg tablet extended release 24 hr 24 hr tablet, Take 1 tablet (50 mg) by mouth once daily., Disp: , Rfl:     ondansetron ODT (Zofran-ODT) 4 mg disintegrating tablet, Take 1 tablet (4 mg) by mouth every 8 hours if needed for nausea or vomiting., Disp: , Rfl:     pregabalin (Lyrica) 75 mg capsule, Take by mouth twice a day. Take per directed, Disp: , Rfl:     triamcinolone (Kenalog) 0.1 % cream, Apply topically 2 times a day. Take per directed, Disp: , Rfl:     triamterene-hydrochlorothiazid (Maxzide-25) 37.5-25 mg tablet, Take by mouth. Take per directed, Disp: , Rfl:   No Known Allergies  There were no vitals taken for this visit.  Neurological Exam/Physical Exam:    tongue / mouth dyskinesia- mild   Constitutional: General appearance: no acute distress   Auscultation of Heart: Regular rate and rhythm, no murmurs, normal S1 and S2.   Carotid Arteries: Intact without any bruits   Peripheral Vascular Exam: Pulses +2 and equal in all extremities. No swelling, varicosities, edema or tenderness to palpations   Mental status: The patient was in no distress, alert, interactive and cooperative. Affect is appropriate.   Orientation: oriented to person, oriented to place and oriented to time.   Memory: recent  "memory intact and remote memory intact.   Attention: normal attention span and normal concentrating ability.   Language: normal comprehension and no difficulty naming common objects.   Fund of knowledge: Patient displays adequate knowledge of current events, adequate fund of knowledge regarding past history and adequate fund of knowledge regarding vocabulary. spastic speech, hypophonic. somewhat nasal at times.   Eyes: The ophthalmoscopic examination was normal. The fundi are visualized with normal disc margins and without hemorrhages   Cranial nerve II: Visual fields full to confrontation.   Cranial nerves III, IV, and VI: Abnormal . unable to look upgaze.   Cranial Nerve V: Facial sensation intact bilaterally.   Cranial nerve VII: Normal and symmetric facial strength.   Cranial nerve VIII: Hearing is intact bilaterally to finger rub / whisper.   Cranial nerves IX and X: Palate elevates symmetrically.   Cranial nerve XI: Shoulder shrug and neck rotation strength are intact.   Cranial nerve XII: Tongue midline with normal strength.   Motor: Motor exam was normal. Muscle bulk was normal in both upper and lower extremities. Abnormal. Muscle strength was 5/5 throughout. Abnormal. tongue / mouth dyskinesia. tremors of R hand at rest at times.. mild bradykinesia. mild rigidity in L >R side.   Deep Tendon Reflexes: left biceps 2+ , right biceps 2+, left triceps 2+, right triceps 2+, left brachioradialis 3+, right brachioradialis 3+, left patella 3+, right patella 3+, left ankle jerk 3+, right ankle jerk 3+   Plantar Reflex: Toes downgoing to plantar stimulation on the left. Toes downgoing to plantar stimulation on the right.   Sensory Exam: Sensory Exam was abnormal. dec vibration in feet b/l.   Coordination: There is no limb dystaxia and rapid alternating movements are intact.   Gait: Abnormal . mildly wide based gait.        Labs:  No components found for: \"THRYOIDSTIMU\"  CBC:   Lab Results   Component Value Date    WBC " 5.3 06/01/2021    HGB 16.1 06/01/2021    HCT 44.5 06/01/2021     06/01/2021     BMP:   Lab Results   Component Value Date     (L) 06/01/2021    K 3.3 (L) 06/01/2021    CL 95 (L) 06/01/2021    CO2 26 06/01/2021    BUN 16 06/01/2021    CREATININE 1.19 06/01/2021    CALCIUM 10.1 06/01/2021     LFT:   Lab Results   Component Value Date    ALKPHOS 69 12/03/2020    BILITOT 1.0 12/03/2020    PROT 7.1 12/03/2020    ALBUMIN 4.6 12/03/2020    ALT 28 12/03/2020    AST 21 12/03/2020         Assessment/Plan   Problem List Items Addressed This Visit             ICD-10-CM    Hydrocephalus (CMS/[facility]) G91.9    Parkinsonism - Primary G20.C    with NPH, parkinsonism, speech/bulbar symptoms 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    He is doing very well from a movement perspective.   he failed amantadine - could not tolerate.   NPH likely contributing.  c/w rytary 95mg 3 caps TID and gocovri.   This is a chronic neurologic condition that requires ongoing care and monitoring. This is a complex, serious condition that needs long term care going forward. Between myself and the patient we will be changing direction of care depending on responses to treatment.   Today we discussed medication options, non medication options for management and various other symptoms that are in relation to this disease.  I will continue to be involved in the care of this patient.

## 2024-04-23 NOTE — PATIENT INSTRUCTIONS
"It was a pleasure seeing you today.     Please make a follow up appointment in 6 months.     To help sleep take Melatonin 5mg 1 tab 2 hours before bedtime. Try for one week, if no improvement increase to 10 mg at night.  If this is not helpful in one more week increase to 15 mg at night (this is over the counter).     For any urgent issues or needing to speak to a medical assistant please call [phone], option 6 during our office hours Monday-Friday 8am-4pm, and leave a voicemail with your concern.  My office will try to reach back you as soon as possible within 24 (business) hours.  If you have an emergency please call 911 or visit a local urgent care or nearest emergency room.      Please understand that [employer] is a useful communication tool for simple \"normal\" results or a refill request but I would not recommend using this tool for emergent or urgent issues or for conversations with me.  I am happy to ask my staff to rearrange a follow up visit or a virtual visit sooner than requested if appropriate for your care.    "

## 2024-04-24 LAB
ANA SER QL HEP2 SUBST: NEGATIVE
HAPTOGLOB SERPL-MCNC: 80 MG/DL (ref 37–246)

## 2024-04-25 DIAGNOSIS — E53.8 VITAMIN B12 DEFICIENCY: ICD-10-CM

## 2024-04-25 LAB
ALBUMIN: 4.5 G/DL (ref 3.4–5)
ALPHA 1 GLOBULIN: 0.3 G/DL (ref 0.2–0.6)
ALPHA 2 GLOBULIN: 0.5 G/DL (ref 0.4–1.1)
BETA GLOBULIN: 0.7 G/DL (ref 0.5–1.2)
EPO SERPL-ACNC: 17 MU/ML (ref 4–27)
GAMMA GLOBULIN: 0.7 G/DL (ref 0.5–1.4)
IMMUNOFIXATION COMMENT: NORMAL
PATH REVIEW - SERUM IMMUNOFIXATION: NORMAL
PATH REVIEW-SERUM PROTEIN ELECTROPHORESIS: NORMAL
PROTEIN ELECTROPHORESIS COMMENT: NORMAL

## 2024-05-02 ENCOUNTER — APPOINTMENT (OUTPATIENT)
Dept: HEMATOLOGY/ONCOLOGY | Facility: CLINIC | Age: 64
End: 2024-05-02
Payer: COMMERCIAL

## 2024-05-11 DIAGNOSIS — F51.01 PRIMARY INSOMNIA: ICD-10-CM

## 2024-05-13 RX ORDER — TRAZODONE HYDROCHLORIDE 50 MG/1
50 TABLET ORAL NIGHTLY PRN
Qty: 90 TABLET | Refills: 2 | Status: SHIPPED | OUTPATIENT
Start: 2024-05-13 | End: 2024-05-29 | Stop reason: SDUPTHER

## 2024-05-21 DIAGNOSIS — I10 BENIGN HYPERTENSION: Primary | ICD-10-CM

## 2024-05-21 DIAGNOSIS — I10 BENIGN HYPERTENSION: ICD-10-CM

## 2024-05-21 RX ORDER — LOSARTAN POTASSIUM 50 MG/1
50 TABLET ORAL DAILY
Qty: 90 TABLET | Refills: 3 | Status: SHIPPED | OUTPATIENT
Start: 2024-05-21 | End: 2024-05-29 | Stop reason: WASHOUT

## 2024-05-21 RX ORDER — LOSARTAN POTASSIUM 50 MG/1
50 TABLET ORAL DAILY
Qty: 90 TABLET | Refills: 3 | Status: SHIPPED | OUTPATIENT
Start: 2024-05-21

## 2024-05-28 PROBLEM — E55.9 VITAMIN D DEFICIENCY: Status: ACTIVE | Noted: 2024-05-28

## 2024-05-28 PROBLEM — G47.33 OSA (OBSTRUCTIVE SLEEP APNEA): Status: RESOLVED | Noted: 2023-09-14 | Resolved: 2024-05-28

## 2024-05-28 PROBLEM — K11.7 SIALORRHEA: Status: RESOLVED | Noted: 2023-09-14 | Resolved: 2024-05-28

## 2024-05-28 PROBLEM — R49.0 DYSPHONIA: Status: RESOLVED | Noted: 2023-09-14 | Resolved: 2024-05-28

## 2024-05-28 PROBLEM — R26.81 UNSTEADY GAIT: Status: RESOLVED | Noted: 2023-09-14 | Resolved: 2024-05-28

## 2024-05-28 PROBLEM — R13.10 DYSPHAGIA: Status: RESOLVED | Noted: 2023-09-14 | Resolved: 2024-05-28

## 2024-05-28 PROBLEM — E11.9 DIABETES MELLITUS (MULTI): Status: ACTIVE | Noted: 2024-05-28

## 2024-05-29 ENCOUNTER — APPOINTMENT (OUTPATIENT)
Dept: PRIMARY CARE | Facility: CLINIC | Age: 64
End: 2024-05-29

## 2024-05-29 ENCOUNTER — OFFICE VISIT (OUTPATIENT)
Dept: PRIMARY CARE | Facility: CLINIC | Age: 64
End: 2024-05-29
Payer: COMMERCIAL

## 2024-05-29 VITALS
DIASTOLIC BLOOD PRESSURE: 74 MMHG | WEIGHT: 165.2 LBS | HEIGHT: 68 IN | SYSTOLIC BLOOD PRESSURE: 118 MMHG | BODY MASS INDEX: 25.04 KG/M2 | TEMPERATURE: 97.7 F

## 2024-05-29 DIAGNOSIS — F51.01 PRIMARY INSOMNIA: ICD-10-CM

## 2024-05-29 DIAGNOSIS — E11.9 CONTROLLED TYPE 2 DIABETES MELLITUS WITHOUT COMPLICATION, WITHOUT LONG-TERM CURRENT USE OF INSULIN (MULTI): Primary | ICD-10-CM

## 2024-05-29 LAB
APPEARANCE UR: CLEAR
BILIRUB UR STRIP.AUTO-MCNC: NEGATIVE MG/DL
COLOR UR: NORMAL
CREAT UR-MCNC: 42.5 MG/DL (ref 20–370)
GLUCOSE UR STRIP.AUTO-MCNC: NORMAL MG/DL
HBA1C MFR BLD: 5.6 % (ref 4.2–6.5)
KETONES UR STRIP.AUTO-MCNC: NEGATIVE MG/DL
LEUKOCYTE ESTERASE UR QL STRIP.AUTO: NEGATIVE
MICROALBUMIN UR-MCNC: <7 MG/L
MICROALBUMIN/CREAT UR: NORMAL MG/G{CREAT}
NITRITE UR QL STRIP.AUTO: NEGATIVE
PH UR STRIP.AUTO: 6.5 [PH]
PROT UR STRIP.AUTO-MCNC: NEGATIVE MG/DL
RBC # UR STRIP.AUTO: NEGATIVE /UL
SP GR UR STRIP.AUTO: 1.01
UROBILINOGEN UR STRIP.AUTO-MCNC: NORMAL MG/DL

## 2024-05-29 PROCEDURE — 81003 URINALYSIS AUTO W/O SCOPE: CPT

## 2024-05-29 PROCEDURE — 90750 HZV VACC RECOMBINANT IM: CPT | Performed by: FAMILY MEDICINE

## 2024-05-29 PROCEDURE — 1036F TOBACCO NON-USER: CPT | Performed by: FAMILY MEDICINE

## 2024-05-29 PROCEDURE — 3078F DIAST BP <80 MM HG: CPT | Performed by: FAMILY MEDICINE

## 2024-05-29 PROCEDURE — 82043 UR ALBUMIN QUANTITATIVE: CPT

## 2024-05-29 PROCEDURE — 83036 HEMOGLOBIN GLYCOSYLATED A1C: CPT | Mod: CLIA WAIVED TEST | Performed by: FAMILY MEDICINE

## 2024-05-29 PROCEDURE — 90471 IMMUNIZATION ADMIN: CPT | Performed by: FAMILY MEDICINE

## 2024-05-29 PROCEDURE — 82570 ASSAY OF URINE CREATININE: CPT

## 2024-05-29 PROCEDURE — 3048F LDL-C <100 MG/DL: CPT | Performed by: FAMILY MEDICINE

## 2024-05-29 PROCEDURE — 99214 OFFICE O/P EST MOD 30 MIN: CPT | Performed by: FAMILY MEDICINE

## 2024-05-29 PROCEDURE — 90472 IMMUNIZATION ADMIN EACH ADD: CPT | Performed by: FAMILY MEDICINE

## 2024-05-29 PROCEDURE — 4010F ACE/ARB THERAPY RXD/TAKEN: CPT | Performed by: FAMILY MEDICINE

## 2024-05-29 PROCEDURE — 3044F HG A1C LEVEL LT 7.0%: CPT | Performed by: FAMILY MEDICINE

## 2024-05-29 PROCEDURE — 90715 TDAP VACCINE 7 YRS/> IM: CPT | Performed by: FAMILY MEDICINE

## 2024-05-29 PROCEDURE — 3074F SYST BP LT 130 MM HG: CPT | Performed by: FAMILY MEDICINE

## 2024-05-29 RX ORDER — TRAZODONE HYDROCHLORIDE 50 MG/1
100 TABLET ORAL NIGHTLY PRN
Qty: 180 TABLET | Refills: 2 | Status: SHIPPED | OUTPATIENT
Start: 2024-05-29 | End: 2025-02-23

## 2024-05-29 ASSESSMENT — PAIN SCALES - GENERAL: PAINLEVEL: 0-NO PAIN

## 2024-05-29 ASSESSMENT — ENCOUNTER SYMPTOMS: DEPRESSION: 0

## 2024-05-29 NOTE — PROGRESS NOTES
"Subjective   Patient ID: Delfino Newberry is a 63 y.o. male who presents for Diabetes (Patient is here to check A1c levels. ).    HPI   Patient for follow-up on diabetes.  Overall he has been doing well.  Parkinson's has been stable.  He has been seeing hematology due to a mild anemia and leukopenia.  He has follow-up in 1 month.    Review of Systems    Objective   /74 (BP Location: Left arm, Patient Position: Sitting, BP Cuff Size: Adult)   Temp 36.5 °C (97.7 °F) (Temporal)   Ht 1.727 m (5' 8\")   Wt 74.9 kg (165 lb 3.2 oz)   BMI 25.12 kg/m²     Physical Exam  Alert, pleasant and in no acute distress.  Heart: Regular rate and rhythm without murmur  Lungs: Clear to auscultation  Lower extremities: No edema  Assessment/Plan     A1c=5.6.  Encouraged continued healthy diet.  Advised patient he can cut his metformin in half since he still does have some slight GI distress.  Will plan to recheck at the A1c in 4 months.  Patient still struggling with sleep.  Advised him he can up his trazodone to 1-1/2 or 2 each night.  He is to call if things are not improving.  Shingles and tetanus vaccines provided.       "

## 2024-06-13 ENCOUNTER — TELEPHONE (OUTPATIENT)
Dept: SCHEDULING | Age: 64
End: 2024-06-13
Payer: COMMERCIAL

## 2024-06-14 DIAGNOSIS — I25.10 CORONARY ARTERY DISEASE INVOLVING NATIVE CORONARY ARTERY OF NATIVE HEART WITHOUT ANGINA PECTORIS: Primary | ICD-10-CM

## 2024-06-16 RX ORDER — METOPROLOL SUCCINATE 25 MG/1
25 TABLET, EXTENDED RELEASE ORAL DAILY
Qty: 90 TABLET | Refills: 3 | Status: SHIPPED | OUTPATIENT
Start: 2024-06-16

## 2024-06-16 NOTE — PROGRESS NOTES
Subjective   Delfino Newberry is a 63 y.o. male.    Chief Complaint:  Follow-up coronary artery disease.    HPI    From a cardiac standpoint he has been doing well.  He has been asymptomatic.  Continues to work full-time.  No anginal symptoms.  No other problems with medications.  Only complaint is that getting lightheaded when he bends over and stands up quickly.    Since 2019 he has developed multiple medical problems. He developed normal pressure hydrocephalus and also has developed symptoms of Parkinson's disease. He has been followed by the neurology service and by the neurosurgery service.      His cardiac history is significant for stenting of the anterior descending coronary artery and of the diagonal branch with a complex PCI performed in 2005. At that time, his left ventricular function was reported to be normal.     Other medical problems including history of hypertension and hyperlipidemia.     Has had a severe reaction to Plavix.    Allergies    Allergy to Naprosyn and to the Plavix.     Family History  Mother    · Family history of coronary artery disease (V17.3) (Z82.49)  Father    · Family history of cerebrovascular accident (CVA) (V17.1) (Z82.3)     Social History  Problems    · Does not use illicit drugs (V49.89) (Z78.9)   · Never a smoker   · Occasional alcohol use    Review of Systems   All other systems reviewed and are negative.      Current Outpatient Medications   Medication Sig Dispense Refill    aspirin 81 mg EC tablet Take 1 tablet (81 mg) by mouth once daily.      atorvastatin (Lipitor) 20 mg tablet Take 1 tablet (20 mg) by mouth once daily.      cyanocobalamin (Vitamin B-12) 1,000 mcg/mL injection Take per directed      folic acid (Folvite) 1 mg tablet Take 1 tablet (1 mg) by mouth once daily. 90 tablet 3    losartan (Cozaar) 50 mg tablet TAKE 1 TABLET BY MOUTH DAILY 90 tablet 3    metFORMIN (Glucophage) 500 mg tablet Take 1 tablet (500 mg) by mouth 2 times daily (morning and late  "afternoon).      metoprolol succinate XL (Toprol-XL) 25 mg 24 hr tablet TAKE 1 TABLET BY MOUTH DAILY 90 tablet 3    ondansetron ODT (Zofran-ODT) 4 mg disintegrating tablet Take 1 tablet (4 mg) by mouth every 8 hours if needed for nausea or vomiting.      pregabalin (Lyrica) 75 mg capsule Take by mouth twice a day. Take per directed      Rytary 23.75-95 mg capsule Take 3 capsules by mouth 3 times a day.      traZODone (Desyrel) 50 mg tablet Take 2 tablets (100 mg) by mouth as needed at bedtime for sleep. 180 tablet 2    amantadine HCL (Gocovri) 137 mg capsule,extended release 24hr Take 2 caps at bedtime 60 capsule 11    valACYclovir (Valtrex) 500 mg tablet Take 1 tablet (500 mg) by mouth if needed (blisters). 40 tablet 3     No current facility-administered medications for this visit.        Visit Vitals  /80 (BP Location: Left arm, Patient Position: Sitting, BP Cuff Size: Adult)   Pulse (!) 48   Ht 1.753 m (5' 9\")   Wt 74.6 kg (164 lb 6.4 oz)   SpO2 96%   BMI 24.28 kg/m²   Smoking Status Never   BSA 1.91 m²        Objective     Constitutional:       Appearance: Not in distress.   Neck:      Vascular: JVD normal.   Pulmonary:      Breath sounds: Normal breath sounds.   Cardiovascular:      Normal rate. Regular rhythm. S1 with normal intensity. S2 with normal intensity.       Murmurs: There is no murmur.      No gallop.    Pulses:     Intact distal pulses.   Edema:     Peripheral edema absent.   Abdominal:      General: Bowel sounds are normal.   Neurological:      Mental Status: Alert and oriented to person, place and time.         Lab Review:   Lab Results   Component Value Date     04/23/2024    K 4.2 04/23/2024     04/23/2024    CO2 26 04/23/2024    BUN 22 04/23/2024    CREATININE 1.31 (H) 04/23/2024    GLUCOSE 87 04/23/2024    CALCIUM 9.2 04/23/2024     Lab Results   Component Value Date    CHOL 100 02/24/2024    TRIG 46 02/24/2024    HDL 38.4 02/24/2024       Assessment:    1.  Coronary artery " disease.  Today's EKG is normal showing no ischemia with normal left ventricular function.  He is asymptomatic.  He is very active.  Will continue medical management.    2.  Hypertension.  Blood pressures are controlled.    3.  Hyperlipidemia.  LDL is 52.    4.  Bradycardia.  Will reduce metoprolol to 12.5 mg daily.

## 2024-06-19 ENCOUNTER — APPOINTMENT (OUTPATIENT)
Dept: CARDIOLOGY | Facility: CLINIC | Age: 64
End: 2024-06-19
Payer: COMMERCIAL

## 2024-06-19 VITALS
HEART RATE: 48 BPM | HEIGHT: 69 IN | DIASTOLIC BLOOD PRESSURE: 80 MMHG | WEIGHT: 164.4 LBS | OXYGEN SATURATION: 96 % | SYSTOLIC BLOOD PRESSURE: 130 MMHG | BODY MASS INDEX: 24.35 KG/M2

## 2024-06-19 DIAGNOSIS — I10 BENIGN HYPERTENSION: ICD-10-CM

## 2024-06-19 DIAGNOSIS — E78.5 HYPERLIPIDEMIA, UNSPECIFIED HYPERLIPIDEMIA TYPE: ICD-10-CM

## 2024-06-19 DIAGNOSIS — B00.9 HERPES SIMPLEX: Primary | ICD-10-CM

## 2024-06-19 DIAGNOSIS — Z95.5 PRESENCE OF STENT IN CORONARY ARTERY: ICD-10-CM

## 2024-06-19 DIAGNOSIS — I25.10 CORONARY ARTERY DISEASE, UNSPECIFIED VESSEL OR LESION TYPE, UNSPECIFIED WHETHER ANGINA PRESENT, UNSPECIFIED WHETHER NATIVE OR TRANSPLANTED HEART: ICD-10-CM

## 2024-06-19 DIAGNOSIS — R00.1 SINUS BRADYCARDIA: ICD-10-CM

## 2024-06-19 PROCEDURE — 3048F LDL-C <100 MG/DL: CPT | Performed by: INTERNAL MEDICINE

## 2024-06-19 PROCEDURE — 99213 OFFICE O/P EST LOW 20 MIN: CPT | Performed by: INTERNAL MEDICINE

## 2024-06-19 PROCEDURE — 1036F TOBACCO NON-USER: CPT | Performed by: INTERNAL MEDICINE

## 2024-06-19 PROCEDURE — 93000 ELECTROCARDIOGRAM COMPLETE: CPT | Performed by: INTERNAL MEDICINE

## 2024-06-19 PROCEDURE — 3075F SYST BP GE 130 - 139MM HG: CPT | Performed by: INTERNAL MEDICINE

## 2024-06-19 PROCEDURE — 4010F ACE/ARB THERAPY RXD/TAKEN: CPT | Performed by: INTERNAL MEDICINE

## 2024-06-19 PROCEDURE — 3079F DIAST BP 80-89 MM HG: CPT | Performed by: INTERNAL MEDICINE

## 2024-06-19 PROCEDURE — 3044F HG A1C LEVEL LT 7.0%: CPT | Performed by: INTERNAL MEDICINE

## 2024-06-19 PROCEDURE — 3062F POS MACROALBUMINURIA REV: CPT | Performed by: INTERNAL MEDICINE

## 2024-06-19 RX ORDER — VALACYCLOVIR HYDROCHLORIDE 500 MG/1
500 TABLET, FILM COATED ORAL AS NEEDED
Qty: 40 TABLET | Refills: 3 | Status: SHIPPED | OUTPATIENT
Start: 2024-06-19 | End: 2025-06-19

## 2024-07-13 DIAGNOSIS — I25.10 CORONARY ARTERY DISEASE, UNSPECIFIED VESSEL OR LESION TYPE, UNSPECIFIED WHETHER ANGINA PRESENT, UNSPECIFIED WHETHER NATIVE OR TRANSPLANTED HEART: ICD-10-CM

## 2024-07-15 RX ORDER — ATORVASTATIN CALCIUM 20 MG/1
20 TABLET, FILM COATED ORAL DAILY
Qty: 90 TABLET | Refills: 3 | Status: SHIPPED | OUTPATIENT
Start: 2024-07-15

## 2024-07-18 ENCOUNTER — APPOINTMENT (OUTPATIENT)
Dept: HEMATOLOGY/ONCOLOGY | Facility: CLINIC | Age: 64
End: 2024-07-18
Payer: COMMERCIAL

## 2024-07-25 DIAGNOSIS — G20.A1 PARKINSON'S DISEASE WITHOUT DYSKINESIA, UNSPECIFIED WHETHER MANIFESTATIONS FLUCTUATE (MULTI): ICD-10-CM

## 2024-07-25 RX ORDER — AMANTADINE 137 MG/1
CAPSULE, COATED PELLETS ORAL
Qty: 60 CAPSULE | Refills: 11 | Status: SHIPPED | OUTPATIENT
Start: 2024-07-25

## 2024-08-01 ENCOUNTER — OFFICE VISIT (OUTPATIENT)
Dept: HEMATOLOGY/ONCOLOGY | Facility: CLINIC | Age: 64
End: 2024-08-01
Payer: COMMERCIAL

## 2024-08-01 ENCOUNTER — LAB (OUTPATIENT)
Dept: LAB | Facility: CLINIC | Age: 64
End: 2024-08-01
Payer: COMMERCIAL

## 2024-08-01 VITALS
HEART RATE: 54 BPM | TEMPERATURE: 97.5 F | BODY MASS INDEX: 23.77 KG/M2 | SYSTOLIC BLOOD PRESSURE: 136 MMHG | RESPIRATION RATE: 20 BRPM | OXYGEN SATURATION: 99 % | DIASTOLIC BLOOD PRESSURE: 75 MMHG | WEIGHT: 160.94 LBS

## 2024-08-01 DIAGNOSIS — G95.9 CERVICAL MYELOPATHY (MULTI): Primary | ICD-10-CM

## 2024-08-01 DIAGNOSIS — E53.8 VITAMIN B12 DEFICIENCY: ICD-10-CM

## 2024-08-01 DIAGNOSIS — D72.819 LEUKOPENIA, UNSPECIFIED TYPE: ICD-10-CM

## 2024-08-01 DIAGNOSIS — D64.9 ANEMIA, UNSPECIFIED TYPE: ICD-10-CM

## 2024-08-01 DIAGNOSIS — F51.01 PRIMARY INSOMNIA: ICD-10-CM

## 2024-08-01 DIAGNOSIS — I10 BENIGN HYPERTENSION: Primary | ICD-10-CM

## 2024-08-01 LAB
BASOPHILS # BLD AUTO: 0.02 X10*3/UL (ref 0–0.1)
BASOPHILS NFR BLD AUTO: 0.7 %
EOSINOPHIL # BLD AUTO: 0.05 X10*3/UL (ref 0–0.7)
EOSINOPHIL NFR BLD AUTO: 1.6 %
ERYTHROCYTE [DISTWIDTH] IN BLOOD BY AUTOMATED COUNT: 12.3 % (ref 11.5–14.5)
HCT VFR BLD AUTO: 37.3 % (ref 41–52)
HGB BLD-MCNC: 13 G/DL (ref 13.5–17.5)
HOLD SPECIMEN: NORMAL
IMM GRANULOCYTES # BLD AUTO: 0.01 X10*3/UL (ref 0–0.7)
IMM GRANULOCYTES NFR BLD AUTO: 0.3 % (ref 0–0.9)
LYMPHOCYTES # BLD AUTO: 0.89 X10*3/UL (ref 1.2–4.8)
LYMPHOCYTES NFR BLD AUTO: 29.1 %
MCH RBC QN AUTO: 32.5 PG (ref 26–34)
MCHC RBC AUTO-ENTMCNC: 34.9 G/DL (ref 32–36)
MCV RBC AUTO: 93 FL (ref 80–100)
MONOCYTES # BLD AUTO: 0.44 X10*3/UL (ref 0.1–1)
MONOCYTES NFR BLD AUTO: 14.4 %
NEUTROPHILS # BLD AUTO: 1.65 X10*3/UL (ref 1.2–7.7)
NEUTROPHILS NFR BLD AUTO: 53.9 %
NRBC BLD-RTO: 0 /100 WBCS (ref 0–0)
PLATELET # BLD AUTO: 177 X10*3/UL (ref 150–450)
RBC # BLD AUTO: 4 X10*6/UL (ref 4.5–5.9)
WBC # BLD AUTO: 3.1 X10*3/UL (ref 4.4–11.3)

## 2024-08-01 PROCEDURE — 36415 COLL VENOUS BLD VENIPUNCTURE: CPT

## 2024-08-01 PROCEDURE — 3078F DIAST BP <80 MM HG: CPT | Performed by: INTERNAL MEDICINE

## 2024-08-01 PROCEDURE — 4010F ACE/ARB THERAPY RXD/TAKEN: CPT | Performed by: INTERNAL MEDICINE

## 2024-08-01 PROCEDURE — 3044F HG A1C LEVEL LT 7.0%: CPT | Performed by: INTERNAL MEDICINE

## 2024-08-01 PROCEDURE — 99213 OFFICE O/P EST LOW 20 MIN: CPT | Performed by: INTERNAL MEDICINE

## 2024-08-01 PROCEDURE — 3062F POS MACROALBUMINURIA REV: CPT | Performed by: INTERNAL MEDICINE

## 2024-08-01 PROCEDURE — 3075F SYST BP GE 130 - 139MM HG: CPT | Performed by: INTERNAL MEDICINE

## 2024-08-01 PROCEDURE — 3048F LDL-C <100 MG/DL: CPT | Performed by: INTERNAL MEDICINE

## 2024-08-01 PROCEDURE — 85025 COMPLETE CBC W/AUTO DIFF WBC: CPT

## 2024-08-01 RX ORDER — TRAZODONE HYDROCHLORIDE 50 MG/1
400 TABLET ORAL NIGHTLY PRN
Qty: 180 TABLET | Refills: 2 | Status: SHIPPED | OUTPATIENT
Start: 2024-08-01 | End: 2025-04-28

## 2024-08-01 RX ORDER — PREGABALIN 75 MG/1
75 CAPSULE ORAL 2 TIMES DAILY
Qty: 60 CAPSULE | Refills: 2 | Status: SHIPPED | OUTPATIENT
Start: 2024-08-01

## 2024-08-01 ASSESSMENT — PAIN SCALES - GENERAL: PAINLEVEL: 0-NO PAIN

## 2024-08-01 NOTE — PROGRESS NOTES
Patient ID: : Delfino Newberry            Primary Care Provider: Heraclio Crespo DO    LOCATION:  [facility]    HEMATOLOGY ONCOLOGY PROBLEMS:  Anemia and leukopenia    CHIEF COMPLAINTS:  Patient is in the clinic today for evaluation of low WBC and RBC counts    HISTORY:  Delfino Newberry is a 63 y.o. male with multiple medical problem who has also been noted to have normocytic anemia.  Blood work from 2024 showed a hemoglobin of 12 with MCV of 93.  WBC was 3.1 with platelet of 265 K.  Extensive hematological workup done by Dr. Crespo revealed normal vitamin B12, folate, ferritin, iron panel, TSH etc.  Review of the previous blood work revealed normal hemoglobin ranging from 15-16 range over the last many years.  WBC were also normal ranging from 4-6 over the years.  Initial hematological evaluation was completely unremarkable in 2024.    INTERVAL HISTORY:  Patient denies any new complaints other than issues with generalized weakness and fatigue.   He also has chronic issues with insomnia.  Of note there is also history of Parkinson disease and normal pressure hydrocephalus.  He has a  shunt placed which is being managed by neurosurgery team.    PAST MEDICAL HISTORY:  1.  Hypertension  2.  Diabetes mellitus type 2  3.  Parkinson's disease/hydrocephalus.  S/p  shunt  4.  Chronic insomnia  5.  Hyperlipidemia  6.  Coronary artery disease  7.  Peripheral neuropathy  8.  Vitamin D deficiency  9.  Obstructive sleep apnea  10.  History of cataract surgery    SOCIAL HISTORY:   for 32 years and lives in [place].  Non-smoker.  Social alcohol intake.  He work in sales for [employer].  Born and raised in [facility].    FAMILY HISTORY:  Father  at age 92 from natural causes.  Mother  at age 88 from natural causes.  10 siblings.  1 brother  from brain tumor at age 55.  He has 3 daughters all alive and well.  No other family history of bleeding,  clotting or malignant disorders in the family history.    REVIEW OF SYSTEMS:   Pertinent finding as per the history above.  All other systems have been reviewed and generally negative and noncontributory.    VITAL SIGNS  BSA: There is no height or weight on file to calculate BSA.  There were no vitals taken for this visit.    PHYSICAL EXAMINATION:  Detailed physical examination not done.    LAB DATA:  Repeat CBC from today shows a white cell count of 3.1 with hemoglobin of 13 and platelets of 177.  ANC is 1.6.  Extensive hematological evaluation was completely unremarkable on 4/23/2024.    ASSESSMENT / PLAN:  1.  Anemia/leukopenia.  No clear etiology.  Please refer to the details as outlined above. In summary, patient with multiple medical problem who has also been noted to have fairly sudden onset of leukopenia and anemia as detailed above.  Extensive hematological workup has already been completed by Dr. Crespo and was essentially unremarkable as detailed above. Initial hematological workup was unremarkable including metabolic profile, SAI, erythropoietin level, Philomena test, SPEP etc. Today.      Long discussion with the patient and he is reassured that the initial hematological evaluation was unremarkable.  To complete the workup I think it will be better to check a bone marrow biopsy  and we will schedule him for the same.  He is again advised to take maximum infection prevention precautions.      This note has been transcribed using Dragon voice recognition system and there is a possibility of unintentional typing misprints.

## 2024-08-02 ENCOUNTER — TELEPHONE (OUTPATIENT)
Dept: PRIMARY CARE | Facility: CLINIC | Age: 64
End: 2024-08-02
Payer: COMMERCIAL

## 2024-08-14 DIAGNOSIS — M47.896 OTHER OSTEOARTHRITIS OF SPINE, LUMBAR REGION: Primary | ICD-10-CM

## 2024-08-14 RX ORDER — CELECOXIB 200 MG/1
200 CAPSULE ORAL 2 TIMES DAILY
Qty: 60 CAPSULE | Refills: 1 | Status: SHIPPED | OUTPATIENT
Start: 2024-08-14 | End: 2025-08-14

## 2024-08-26 ENCOUNTER — HOSPITAL ENCOUNTER (OUTPATIENT)
Dept: RADIOLOGY | Facility: HOSPITAL | Age: 64
Discharge: HOME | End: 2024-08-26
Payer: COMMERCIAL

## 2024-08-26 VITALS
RESPIRATION RATE: 16 BRPM | DIASTOLIC BLOOD PRESSURE: 72 MMHG | HEART RATE: 40 BPM | WEIGHT: 160 LBS | SYSTOLIC BLOOD PRESSURE: 128 MMHG | HEIGHT: 69 IN | OXYGEN SATURATION: 100 % | BODY MASS INDEX: 23.7 KG/M2 | TEMPERATURE: 98.1 F

## 2024-08-26 DIAGNOSIS — D72.819 LEUKOPENIA, UNSPECIFIED TYPE: ICD-10-CM

## 2024-08-26 DIAGNOSIS — D64.9 ANEMIA, UNSPECIFIED TYPE: ICD-10-CM

## 2024-08-26 DIAGNOSIS — E53.8 VITAMIN B12 DEFICIENCY: ICD-10-CM

## 2024-08-26 DIAGNOSIS — D64.9 ANEMIA, UNSPECIFIED TYPE: Primary | ICD-10-CM

## 2024-08-26 LAB
ERYTHROCYTE [DISTWIDTH] IN BLOOD BY AUTOMATED COUNT: 13 % (ref 11.5–14.5)
HCT VFR BLD AUTO: 36.5 % (ref 41–52)
HGB BLD-MCNC: 12.5 G/DL (ref 13.5–17.5)
MCH RBC QN AUTO: 33.1 PG (ref 26–34)
MCHC RBC AUTO-ENTMCNC: 34.2 G/DL (ref 32–36)
MCV RBC AUTO: 97 FL (ref 80–100)
NRBC BLD-RTO: 0 /100 WBCS (ref 0–0)
PLATELET # BLD AUTO: 143 X10*3/UL (ref 150–450)
RBC # BLD AUTO: 3.78 X10*6/UL (ref 4.5–5.9)
WBC # BLD AUTO: 3.6 X10*3/UL (ref 4.4–11.3)

## 2024-08-26 PROCEDURE — 20225 BONE BIOPSY TROCAR/NDL DEEP: CPT

## 2024-08-26 PROCEDURE — 76942 ECHO GUIDE FOR BIOPSY: CPT

## 2024-08-26 PROCEDURE — 85097 BONE MARROW INTERPRETATION: CPT | Mod: PARLAB | Performed by: INTERNAL MEDICINE

## 2024-08-26 PROCEDURE — 7100000009 HC PHASE TWO TIME - INITIAL BASE CHARGE

## 2024-08-26 PROCEDURE — 2500000005 HC RX 250 GENERAL PHARMACY W/O HCPCS: Performed by: RADIOLOGY

## 2024-08-26 PROCEDURE — 7100000010 HC PHASE TWO TIME - EACH INCREMENTAL 1 MINUTE

## 2024-08-26 PROCEDURE — 2500000004 HC RX 250 GENERAL PHARMACY W/ HCPCS (ALT 636 FOR OP/ED): Performed by: RADIOLOGY

## 2024-08-26 PROCEDURE — 36415 COLL VENOUS BLD VENIPUNCTURE: CPT | Performed by: INTERNAL MEDICINE

## 2024-08-26 PROCEDURE — 88185 FLOWCYTOMETRY/TC ADD-ON: CPT | Mod: TC,PARLAB | Performed by: INTERNAL MEDICINE

## 2024-08-26 PROCEDURE — 2720000007 HC OR 272 NO HCPCS

## 2024-08-26 PROCEDURE — 85027 COMPLETE CBC AUTOMATED: CPT | Performed by: RADIOLOGY

## 2024-08-26 PROCEDURE — 36415 COLL VENOUS BLD VENIPUNCTURE: CPT | Performed by: RADIOLOGY

## 2024-08-26 RX ORDER — FENTANYL CITRATE 50 UG/ML
INJECTION, SOLUTION INTRAMUSCULAR; INTRAVENOUS
Status: COMPLETED | OUTPATIENT
Start: 2024-08-26 | End: 2024-08-26

## 2024-08-26 RX ORDER — LIDOCAINE HYDROCHLORIDE 10 MG/ML
INJECTION, SOLUTION EPIDURAL; INFILTRATION; INTRACAUDAL; PERINEURAL
Status: COMPLETED | OUTPATIENT
Start: 2024-08-26 | End: 2024-08-26

## 2024-08-26 ASSESSMENT — PAIN SCALES - GENERAL
PAINLEVEL_OUTOF10: 0 - NO PAIN

## 2024-08-26 ASSESSMENT — PAIN - FUNCTIONAL ASSESSMENT
PAIN_FUNCTIONAL_ASSESSMENT: 0-10

## 2024-08-26 NOTE — PRE-PROCEDURE NOTE
Interventional Radiology Preprocedure Note    Indication for procedure: Diagnoses of Leukopenia, unspecified type, Anemia, unspecified type, and Vitamin B12 deficiency were pertinent to this visit.    Relevant review of systems: NA    Relevant Labs:   Lab Results   Component Value Date    CREATININE 1.31 (H) 04/23/2024    EGFR 61 04/23/2024    INR 1.0 06/11/2019    PROTIME 11.0 06/11/2019       Planned Sedation/Anesthesia: Moderate    Airway assessment: normal    Directed physical examination:    Aox3  No increased work of breathing.   No acute distress      Mallampati: II (hard and soft palate, upper portion of tonsils and uvula visible)    ASA Score: ASA 2 - Patient with mild systemic disease with no functional limitations    Benefits, risks and alternatives of procedure and planned sedation have been discussed with the patient and/or their representative. All questions answered and they agree to proceed.

## 2024-08-26 NOTE — PROCEDURES
Interventional Radiology Brief Postprocedure Note    Attending: Patrick Andrews MD    Assistant:   Staff Role   Nancy Baca MT Radiology Technologist   Virginia Madison MT Radiology Technologist   Pat Rojas, HAIDER Radiology Nurse   Patrick Andrews MD Radiologist       Diagnosis:   1. Leukopenia, unspecified type  IR biopsy bone marrow    IR biopsy bone marrow      2. Anemia, unspecified type  IR biopsy bone marrow    IR biopsy bone marrow      3. Vitamin B12 deficiency  IR biopsy bone marrow    IR biopsy bone marrow          Description of procedure: IR biopsy bone marrow left iliac    Timeout:  Yes    Procedure Area: Procedure Area     Anesthesia:   Conscious Sedation    Complications: None    Estimated Blood Loss: minimal    Medications (Filter: Administrations occurring from 1020 to 1036 on 08/26/24) As of 08/26/24 1036      fentaNYL PF (Sublimaze) injection (mcg) Total dose:  50 mcg      Date/Time Rate/Dose/Volume Action       08/26/24  1029 50 mcg Given               lidocaine PF (Xylocaine) 10 mg/mL (1 %) injection (mL) Total volume:  10 mL      Date/Time Rate/Dose/Volume Action       08/26/24  1029 10 mL Given                   No specimens collected      See detailed result report with images in PACS.    The patient tolerated the procedure well without incident or complication and is in stable condition.

## 2024-08-27 ENCOUNTER — OFFICE VISIT (OUTPATIENT)
Dept: PAIN MEDICINE | Facility: CLINIC | Age: 64
End: 2024-08-27
Payer: COMMERCIAL

## 2024-08-27 VITALS
OXYGEN SATURATION: 96 % | TEMPERATURE: 97.7 F | WEIGHT: 160 LBS | BODY MASS INDEX: 23.7 KG/M2 | HEART RATE: 83 BPM | RESPIRATION RATE: 18 BRPM | SYSTOLIC BLOOD PRESSURE: 120 MMHG | DIASTOLIC BLOOD PRESSURE: 72 MMHG | HEIGHT: 69 IN

## 2024-08-27 DIAGNOSIS — M54.50 ACUTE RIGHT-SIDED LOW BACK PAIN WITHOUT SCIATICA: Primary | ICD-10-CM

## 2024-08-27 PROCEDURE — 3062F POS MACROALBUMINURIA REV: CPT | Performed by: ANESTHESIOLOGY

## 2024-08-27 PROCEDURE — 3048F LDL-C <100 MG/DL: CPT | Performed by: ANESTHESIOLOGY

## 2024-08-27 PROCEDURE — 99213 OFFICE O/P EST LOW 20 MIN: CPT | Performed by: ANESTHESIOLOGY

## 2024-08-27 PROCEDURE — 4010F ACE/ARB THERAPY RXD/TAKEN: CPT | Performed by: ANESTHESIOLOGY

## 2024-08-27 PROCEDURE — 3078F DIAST BP <80 MM HG: CPT | Performed by: ANESTHESIOLOGY

## 2024-08-27 PROCEDURE — 99203 OFFICE O/P NEW LOW 30 MIN: CPT | Performed by: ANESTHESIOLOGY

## 2024-08-27 PROCEDURE — 3074F SYST BP LT 130 MM HG: CPT | Performed by: ANESTHESIOLOGY

## 2024-08-27 PROCEDURE — 3044F HG A1C LEVEL LT 7.0%: CPT | Performed by: ANESTHESIOLOGY

## 2024-08-27 PROCEDURE — 3008F BODY MASS INDEX DOCD: CPT | Performed by: ANESTHESIOLOGY

## 2024-08-27 PROCEDURE — 1036F TOBACCO NON-USER: CPT | Performed by: ANESTHESIOLOGY

## 2024-08-27 SDOH — ECONOMIC STABILITY: FOOD INSECURITY: WITHIN THE PAST 12 MONTHS, THE FOOD YOU BOUGHT JUST DIDN'T LAST AND YOU DIDN'T HAVE MONEY TO GET MORE.: NEVER TRUE

## 2024-08-27 SDOH — ECONOMIC STABILITY: FOOD INSECURITY: WITHIN THE PAST 12 MONTHS, YOU WORRIED THAT YOUR FOOD WOULD RUN OUT BEFORE YOU GOT MONEY TO BUY MORE.: NEVER TRUE

## 2024-08-27 ASSESSMENT — ENCOUNTER SYMPTOMS
ADENOPATHY: 0
NUMBNESS: 0
SHORTNESS OF BREATH: 0
COLOR CHANGE: 0
BLOOD IN STOOL: 0
OCCASIONAL FEELINGS OF UNSTEADINESS: 0
BACK PAIN: 1
DEPRESSION: 0
FEVER: 0
LOSS OF SENSATION IN FEET: 0
EYE PAIN: 0
DIFFICULTY URINATING: 0

## 2024-08-27 ASSESSMENT — PAIN - FUNCTIONAL ASSESSMENT: PAIN_FUNCTIONAL_ASSESSMENT: 0-10

## 2024-08-27 ASSESSMENT — PAIN DESCRIPTION - DESCRIPTORS: DESCRIPTORS: ACHING

## 2024-08-27 ASSESSMENT — PATIENT HEALTH QUESTIONNAIRE - PHQ9
SUM OF ALL RESPONSES TO PHQ9 QUESTIONS 1 AND 2: 0
1. LITTLE INTEREST OR PLEASURE IN DOING THINGS: NOT AT ALL
2. FEELING DOWN, DEPRESSED OR HOPELESS: NOT AT ALL

## 2024-08-27 ASSESSMENT — LIFESTYLE VARIABLES
SKIP TO QUESTIONS 9-10: 1
AUDIT TOTAL SCORE: 2
TOTAL SCORE: 0
HOW OFTEN DURING THE LAST YEAR HAVE YOU NEEDED AN ALCOHOLIC DRINK FIRST THING IN THE MORNING TO GET YOURSELF GOING AFTER A NIGHT OF HEAVY DRINKING: NEVER
HOW OFTEN DURING THE LAST YEAR HAVE YOU FAILED TO DO WHAT WAS NORMALLY EXPECTED FROM YOU BECAUSE OF DRINKING: NEVER
HOW OFTEN DO YOU HAVE SIX OR MORE DRINKS ON ONE OCCASION: NEVER
HOW OFTEN DURING THE LAST YEAR HAVE YOU HAD A FEELING OF GUILT OR REMORSE AFTER DRINKING: NEVER
HAS A RELATIVE, FRIEND, DOCTOR, OR ANOTHER HEALTH PROFESSIONAL EXPRESSED CONCERN ABOUT YOUR DRINKING OR SUGGESTED YOU CUT DOWN: NO
HOW OFTEN DURING THE LAST YEAR HAVE YOU FOUND THAT YOU WERE NOT ABLE TO STOP DRINKING ONCE YOU HAD STARTED: NEVER
HAVE YOU OR SOMEONE ELSE BEEN INJURED AS A RESULT OF YOUR DRINKING: NO
HOW OFTEN DO YOU HAVE A DRINK CONTAINING ALCOHOL: 2-4 TIMES A MONTH
HOW MANY STANDARD DRINKS CONTAINING ALCOHOL DO YOU HAVE ON A TYPICAL DAY: 1 OR 2
AUDIT-C TOTAL SCORE: 2
HOW OFTEN DURING THE LAST YEAR HAVE YOU BEEN UNABLE TO REMEMBER WHAT HAPPENED THE NIGHT BEFORE BECAUSE YOU HAD BEEN DRINKING: NEVER

## 2024-08-27 ASSESSMENT — COLUMBIA-SUICIDE SEVERITY RATING SCALE - C-SSRS
1. IN THE PAST MONTH, HAVE YOU WISHED YOU WERE DEAD OR WISHED YOU COULD GO TO SLEEP AND NOT WAKE UP?: NO
6. HAVE YOU EVER DONE ANYTHING, STARTED TO DO ANYTHING, OR PREPARED TO DO ANYTHING TO END YOUR LIFE?: NO
2. HAVE YOU ACTUALLY HAD ANY THOUGHTS OF KILLING YOURSELF?: NO

## 2024-08-27 ASSESSMENT — PAIN SCALES - GENERAL
PAINLEVEL_OUTOF10: 2
PAINLEVEL: 2

## 2024-08-27 NOTE — PROGRESS NOTES
Chief Complaint   Patient presents with    New Patient Visit     Lower back pain     History of Present Complaint:  The patient was referred to us by Referring Provider: Dr. Hedrick. this is 63 y.o.  male  with a past history of  hypertension coronary artery disease hyperlipidemia vitamin D deficiency, type 2 diabetes sclerosis Cerebral ventriculomegaly, Hydrocephalus, cervical myopathy ,paresthesias in both feet, history of prkinsonm , headche , oral dyskinesia , also daytime sleepiness snoring   presenting with  lower back pain    Pain started one week ago.  Pain is better with Celebrex.  Pain is worst lifting , turning make is pain worst .      The pain is described as achiness .    Prior Pain Therapies:  Celebrex   Past surgical history:   Cerebral shunt and the cardiac stents    Employment/disability/litigation:  Patient works for [employer]  Social history: , Has 3children, 2grandchildren and 0great-grandchildren, and Finished high school    Diagnostic studies:  X-rays done at [place] emergency room.    Opioid Risk Assessment Score 0/26    Aric Each Box that Applies Female Male   FAMILY HISTORY OF SUBSTANCE ABUSE  Aric the boxes that applies   Alcohol ?  1    ? 3   Illegal drugs ?  2 ? 3   Rx drugs ?  4 ? 4   PERSONAL HISTORY OF SUBSTNACE ABUSE   Alcohol ?  3 ?  3   Illegal drugs ?  4 ?  4    Rx drugs ?  5 ?  5   Age Between 16-45 years ?  1 ?  1   History of Preadolescent Sexual Abuse ?  3 ?  0   PSYCHOLOGIC DISEASE   ADD, OCD, bipolar, schizophrenia ?  2 ?  2   Depression ?  1 ?  1   Scoring Totals       Scoring (Risk)  0-3 - Low  4-7 - Moderate  8 - High    Opioid Risk Assessment Score 0/26

## 2024-08-27 NOTE — PROGRESS NOTES
Chief Complain    New Patient Visit (Lower back pain)    History Of Present Illness  Delfino Newberry is a 63 y.o. male here for evaluation of right sided low back pain. The patient has been experiencing these symptoms for last 3 week(s). The patient describes the pain as sharp, dull. The patient's current pain score is 2 on a scale from 0-10. The pain is worsened by bending forward and lifting and is alleviated by medications nonsteroidal anti-inflammatory drugs. Since the start of the symptoms the pain has been better.    The patient denies any fever, chills, weight loss, weakness, numbness, bladder/ bowel incontinence, history of cancer, history of IV drug abuse, recent trauma.      Past Medical History  He has a past medical history of (Idiopathic) normal pressure hydrocephalus (Multi) (07/17/2021), Atherosclerotic heart disease of native coronary artery without angina pectoris (06/20/2022), Disease of spinal cord, unspecified (Multi) (07/15/2021), Obstructive sleep apnea (adult) (pediatric) (07/15/2021), Obstructive sleep apnea (adult) (pediatric) (02/07/2019), Other hyperlipidemia (07/15/2021), Parkinson's disease (Multi) (11/29/2022), Personal history of other diseases of the circulatory system (07/15/2021), Personal history of other diseases of the circulatory system (07/15/2021), Personal history of other diseases of the circulatory system, Personal history of other diseases of the musculoskeletal system and connective tissue, Personal history of other endocrine, nutritional and metabolic disease (07/15/2021), Personal history of other specified conditions (06/01/2018), and Personal history of other specified conditions (07/15/2021).    Surgical History  He has a past surgical history that includes Other surgical history (07/17/2021) and Cardiac catheterization (07/17/2021).    Social History  He reports that he has never smoked. He has never used smokeless tobacco. He reports current alcohol use. He reports  that he does not use drugs.    Family History  Family History   Problem Relation Name Age of Onset    Coronary artery disease Mother      Stroke Father          Allergies  Naproxen sodium, Lisinopril, and Plavix [clopidogrel]    Review of Systems  Review of Systems   Constitutional:  Negative for fever.   HENT:  Negative for ear pain.    Eyes:  Negative for pain.   Respiratory:  Negative for shortness of breath.    Cardiovascular:  Negative for chest pain.   Gastrointestinal:  Negative for blood in stool.   Endocrine: Negative for cold intolerance and heat intolerance.   Genitourinary:  Negative for difficulty urinating.   Musculoskeletal:  Positive for back pain.   Skin:  Negative for color change.   Allergic/Immunologic: Negative for food allergies.   Neurological:  Negative for numbness.   Hematological:  Negative for adenopathy.   Psychiatric/Behavioral:  Negative for suicidal ideas.         Physical Exam  Physical Exam  Constitutional:       Appearance: Normal appearance.   HENT:      Head: Normocephalic and atraumatic.   Eyes:      Extraocular Movements: Extraocular movements intact.      Pupils: Pupils are equal, round, and reactive to light.   Cardiovascular:      Rate and Rhythm: Normal rate.   Pulmonary:      Effort: Pulmonary effort is normal.   Abdominal:      Palpations: Abdomen is soft.   Musculoskeletal:      Cervical back: Neck supple.      Lumbar back: No tenderness or bony tenderness. Normal range of motion. Negative right straight leg raise test and negative left straight leg raise test. No scoliosis.        Back:    Skin:     General: Skin is warm.   Neurological:      Mental Status: He is alert and oriented to person, place, and time.      Motor: Motor function is intact.      Gait: Gait is intact.      Deep Tendon Reflexes:      Reflex Scores:       Patellar reflexes are 1+ on the right side and 1+ on the left side.       Achilles reflexes are 1+ on the right side and 1+ on the left  "side.  Psychiatric:         Mood and Affect: Mood normal.         Behavior: Behavior normal.           Last Recorded Vitals  Blood pressure 120/72, pulse 83, temperature 36.5 °C (97.7 °F), temperature source Temporal, resp. rate 18, height 1.753 m (5' 9\"), weight 72.6 kg (160 lb), SpO2 96%.    Reviewed Labs   Latest Reference Range & Units 04/23/24 12:37   GLUCOSE 74 - 99 mg/dL 87   SODIUM 136 - 145 mmol/L 140   POTASSIUM 3.5 - 5.3 mmol/L 4.2   CHLORIDE 98 - 107 mmol/L 107   Bicarbonate 21 - 32 mmol/L 26   Anion Gap 10 - 20 mmol/L 11   Blood Urea Nitrogen 6 - 23 mg/dL 22   Creatinine 0.50 - 1.30 mg/dL 1.31 (H)   EGFR >60 mL/min/1.73m*2 61   Calcium 8.6 - 10.3 mg/dL 9.2   Albumin 3.4 - 5.0 g/dL 4.5   Alkaline Phosphatase 33 - 136 U/L 61   ALT 10 - 52 U/L 7 (L)   AST 9 - 39 U/L 14   Bilirubin Total 0.0 - 1.2 mg/dL 1.1   (H): Data is abnormally high  (L): Data is abnormally low      Latest Reference Range & Units 08/26/24 09:34   WBC 4.4 - 11.3 x10*3/uL 3.6 (L)   nRBC 0.0 - 0.0 /100 WBCs 0.0   RBC 4.50 - 5.90 x10*6/uL 3.78 (L)   HEMOGLOBIN 13.5 - 17.5 g/dL 12.5 (L)   HEMATOCRIT 41.0 - 52.0 % 36.5 (L)   MCV 80 - 100 fL 97   MCH 26.0 - 34.0 pg 33.1   MCHC 32.0 - 36.0 g/dL 34.2   RED CELL DISTRIBUTION WIDTH 11.5 - 14.5 % 13.0   Platelets 150 - 450 x10*3/uL 143 (L)   (L): Data is abnormally low  Assessment/Plan   Encounter Diagnosis   Name Primary?    Acute right-sided low back pain without sciatica Yes        Delfino Newberry is a 63 y.o. male here for evaluation of acute onset right-sided low back pain.  He has been experiencing these symptoms for last 3 weeks or so started after he was doing some lifting.  He was seen at the ER where he had an x-ray done which did not reveal any acute findings.  Since then his pain has improved significantly, today his pain is 2 out of 10.  On physical examination does not have any tenderness in his low back or in the area where he is having pain.  Does not have any significant focal " neurological deficit.  Would recommend trial of physical therapy as next step in managing his pain.  If he continues to have significant symptoms would consider getting an MRI for further evaluation.  No interventions are necessary at this point.             Taiwo Rushing MD

## 2024-08-28 LAB
CELL COUNT (BLOOD): 13.21 X10*3/UL
CELL POPULATIONS: NORMAL
DIAGNOSIS: NORMAL
FLOW DIFFERENTIAL: NORMAL
FLOW TEST ORDERED: NORMAL
LAB TEST METHOD: NORMAL
NUMBER OF CELLS COLLECTED: NORMAL
PATH REPORT.TOTAL CANCER: NORMAL
SIGNATURE COMMENT: NORMAL
SPECIMEN VIABILITY: NORMAL

## 2024-08-30 LAB
PATH REPORT.COMMENTS IMP SPEC: NORMAL
PATH REPORT.FINAL DX SPEC: NORMAL
PATH REPORT.GROSS SPEC: NORMAL
PATH REPORT.MICROSCOPIC SPEC OTHER STN: NORMAL
PATH REPORT.RELEVANT HX SPEC: NORMAL
PATH REPORT.TOTAL CANCER: NORMAL

## 2024-09-01 DIAGNOSIS — G20.A1 PARKINSON'S DISEASE (MULTI): ICD-10-CM

## 2024-09-03 RX ORDER — LEVODOPA AND CARBIDOPA 95; 23.75 MG/1; MG/1
3 CAPSULE, EXTENDED RELEASE ORAL 3 TIMES DAILY
Qty: 810 CAPSULE | Refills: 3 | Status: SHIPPED | OUTPATIENT
Start: 2024-09-03

## 2024-09-06 LAB
ELECTRONICALLY SIGNED BY: NORMAL
MYELOID NGS RESULTS: NORMAL

## 2024-09-11 ENCOUNTER — TELEPHONE (OUTPATIENT)
Dept: HEMATOLOGY/ONCOLOGY | Facility: CLINIC | Age: 64
End: 2024-09-11
Payer: COMMERCIAL

## 2024-10-06 DIAGNOSIS — M47.896 OTHER SPONDYLOSIS, LUMBAR REGION: Primary | ICD-10-CM

## 2024-10-07 RX ORDER — CELECOXIB 200 MG/1
200 CAPSULE ORAL 2 TIMES DAILY
Qty: 90 CAPSULE | Refills: 3 | Status: SHIPPED | OUTPATIENT
Start: 2024-10-07

## 2024-10-08 ENCOUNTER — EVALUATION (OUTPATIENT)
Dept: PHYSICAL THERAPY | Facility: CLINIC | Age: 64
End: 2024-10-08
Payer: COMMERCIAL

## 2024-10-08 DIAGNOSIS — M54.50 ACUTE RIGHT-SIDED LOW BACK PAIN WITHOUT SCIATICA: ICD-10-CM

## 2024-10-08 PROCEDURE — 97161 PT EVAL LOW COMPLEX 20 MIN: CPT | Mod: GP | Performed by: PHYSICAL THERAPIST

## 2024-10-08 PROCEDURE — 97110 THERAPEUTIC EXERCISES: CPT | Mod: GP | Performed by: PHYSICAL THERAPIST

## 2024-10-08 NOTE — PROGRESS NOTES
Physical Therapy Evaluation    Patient Name: Delfino Newberry  MRN: [id number]  Today's Date: 10/8/2024  Time Calculation  Start Time: 0415  Stop Time: 0446  Time Calculation (min): 31 min    Visit #: 1  Visits approved: Med necessity.  Certification dates: NA    Precautions:  Precautions  Precautions Comment: None. Low fall risk.    Subjective/History    Other Measures  Oswestry Disablity Index (LASHON): 0    DOI: 9/17/24.  Mechanism of injury: Possibly sec to ordinary yard work.  Area of symptoms: Intermittent sharp or dull right low back pain. No pain x ~2 wks- doing all ordinary activities w/o pain.     Aggravating factors: Don socks, turn to look over either shoulder, stand for several minutes. No longer has any aggravating factors.  Easing factors: Celebrex.  Red flags: None.  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   Recreation/Hobbies/Sports: Golf- no longer limited.  Living situation: Unremarkable   Barriers to treatment: None.   Preferred language: English.    Objective Findings  Observation/gait: Flat mid LS.  AROM LS flex: wnl. Ext: ~20 deg- stiff. SB wnl bilat.   Muscle activation/Resisted testing: TA activation: Fair/poor.  Palpation: Soft tissue: wnl.  IV- LS PA: stiff throughout.    Treatment:   Therex: Hooklying trunk rot, pelvic tilts, bent knee fall-outs- each to HEP 1 min, 2x/day.     Assessment  Patient presents with decr ROM, local jt stiffness consistent with multilevel DJD.     Plan  Physical therapy 2 times in 4 wks. Therapy may include the following: Therapeutic exercise, manual therapy, education/home program.     Goals: Demo HEP correctly to prevent recurrence of LBP. Demo correct low lifting mechanics to help prevent future LS dysfunction.

## 2024-10-10 DIAGNOSIS — B00.9 HERPES SIMPLEX: ICD-10-CM

## 2024-10-11 RX ORDER — VALACYCLOVIR HYDROCHLORIDE 500 MG/1
500 TABLET, FILM COATED ORAL AS NEEDED
Qty: 30 TABLET | Refills: 1 | Status: SHIPPED | OUTPATIENT
Start: 2024-10-11 | End: 2025-10-11

## 2024-10-14 DIAGNOSIS — F51.01 PRIMARY INSOMNIA: ICD-10-CM

## 2024-10-14 RX ORDER — TRAZODONE HYDROCHLORIDE 100 MG/1
400 TABLET ORAL NIGHTLY PRN
Qty: 120 TABLET | Refills: 3 | Status: SHIPPED | OUTPATIENT
Start: 2024-10-14 | End: 2025-02-11

## 2024-10-15 ENCOUNTER — APPOINTMENT (OUTPATIENT)
Dept: NEUROLOGY | Facility: CLINIC | Age: 64
End: 2024-10-15
Payer: COMMERCIAL

## 2024-10-15 VITALS
HEIGHT: 69 IN | TEMPERATURE: 98 F | BODY MASS INDEX: 24.53 KG/M2 | SYSTOLIC BLOOD PRESSURE: 112 MMHG | HEART RATE: 78 BPM | RESPIRATION RATE: 18 BRPM | DIASTOLIC BLOOD PRESSURE: 74 MMHG | WEIGHT: 165.6 LBS

## 2024-10-15 DIAGNOSIS — M79.89 LEG SWELLING: Primary | ICD-10-CM

## 2024-10-15 DIAGNOSIS — G25.0 ESSENTIAL TREMOR: ICD-10-CM

## 2024-10-15 DIAGNOSIS — G47.09 OTHER INSOMNIA: ICD-10-CM

## 2024-10-15 LAB
BAND RESOLUTION: 400 BANDS
CHROM ANALY OVERALL INTERP-IMP: NORMAL
CHROMOSOME ANALYSIS CELLS ANALYZED: 20 CELLS
CHROMOSOME ANALYSIS CELLS IMAGED: 5 CELLS
CHROMOSOME ANALYSIS HYPERMODAL CELL COUNT: 0 CELLS
CHROMOSOME ANALYSIS HYPOMODAL CELL COUNT: 1 CELLS
CHROMOSOME ANALYSIS MODAL CHROMOSOME NO: 46 CHROMOSOMES
CHROMOSOME ANALYSIS STAINING METHOD: NORMAL
ELECTRONICALLY SIGNED BY CYTOGENETICS: NORMAL
KARYOTYP MAR: 2 CELLS
TOTAL CELLS COUNTED MAR: 20 CELLS

## 2024-10-15 PROCEDURE — 3062F POS MACROALBUMINURIA REV: CPT | Performed by: PSYCHIATRY & NEUROLOGY

## 2024-10-15 PROCEDURE — 3074F SYST BP LT 130 MM HG: CPT | Performed by: PSYCHIATRY & NEUROLOGY

## 2024-10-15 PROCEDURE — 3044F HG A1C LEVEL LT 7.0%: CPT | Performed by: PSYCHIATRY & NEUROLOGY

## 2024-10-15 PROCEDURE — 99214 OFFICE O/P EST MOD 30 MIN: CPT | Performed by: PSYCHIATRY & NEUROLOGY

## 2024-10-15 PROCEDURE — G2211 COMPLEX E/M VISIT ADD ON: HCPCS | Performed by: PSYCHIATRY & NEUROLOGY

## 2024-10-15 PROCEDURE — 4010F ACE/ARB THERAPY RXD/TAKEN: CPT | Performed by: PSYCHIATRY & NEUROLOGY

## 2024-10-15 PROCEDURE — 3078F DIAST BP <80 MM HG: CPT | Performed by: PSYCHIATRY & NEUROLOGY

## 2024-10-15 PROCEDURE — 3008F BODY MASS INDEX DOCD: CPT | Performed by: PSYCHIATRY & NEUROLOGY

## 2024-10-15 PROCEDURE — 1036F TOBACCO NON-USER: CPT | Performed by: PSYCHIATRY & NEUROLOGY

## 2024-10-15 PROCEDURE — 3048F LDL-C <100 MG/DL: CPT | Performed by: PSYCHIATRY & NEUROLOGY

## 2024-10-15 ASSESSMENT — PATIENT HEALTH QUESTIONNAIRE - PHQ9
2. FEELING DOWN, DEPRESSED OR HOPELESS: NOT AT ALL
SUM OF ALL RESPONSES TO PHQ9 QUESTIONS 1 AND 2: 0
1. LITTLE INTEREST OR PLEASURE IN DOING THINGS: NOT AT ALL

## 2024-10-15 ASSESSMENT — PAIN SCALES - GENERAL: PAINLEVEL: 0-NO PAIN

## 2024-10-15 NOTE — PATIENT INSTRUCTIONS
"It was a pleasure seeing you today.     Save the Date for The 15th [facility]' Parkinson's Boot Camp  Saturday, November 16th at the [place], [place]  [email or web address]     Please make a follow up appointment in 4-6 months.  You may also schedule a phone or virtual visit sooner on a Friday morning with me as needed before the next clinic appointment.     For any urgent issues or needing to speak to a medical assistant please call [phone], option 6 during our office hours Monday-Friday 8am-4pm, and leave a voicemail with your concern.  My office will try to reach back you as soon as possible within 24 (business) hours.  If you have an emergency please call 911 or visit a local urgent care or nearest emergency room.      Please understand that "[employer] is a useful communication tool for simple \"normal\" results or a refill request but I would not recommend using this tool for emergent or urgent issues or for conversations with me.  I am happy to ask my staff to rearrange a follow up visit or a virtual visit sooner than requested if appropriate for your care.    "

## 2024-10-15 NOTE — PROGRESS NOTES
Subjective     Delfino Newberry is a 63 y.o. year old male here for PD and NPH follow up.     HPI  MRI brain showed worsening gliosis near his  shunt. ( Was ordered due to having  and periodic Has)  Headaches are better.  He is more shaky, tremulous.   Not changing in the daytime.    He has a heel spur and got cortisone injection in left heel.  He has noticed more left leg swelling the past 2 weeks.  Had a bone marrow biopsy recently and told to follow up in 6 months.   His sleep is really bad. He is taking trazodone 400mg at bedtime.     Had a recent PT evaluation.   Was in the ER in August at [facility] for back pain.   Celebrex helped.   Ropinirole was stopped due to a rash - turned out not to be due to Ropinirole.    Current PD meds:  Rytary 95mg 3 caps 6am/ 3 caps at noon/ 3 caps at 6pm.   Gocovri 37mg 2 caps at night - helped tongue dyskinesia.     He feels that Rytary helps a lot with his tongue dyskinesia and movement and speech.   Rarely notice it now.    He has sleeping issues, tried Ambien.   Trazodone helps somewhat.   Due to oral / tongue dyskinesia he stopped Sinemet (even on 1/2 tab TID) and started Ropinirole but the oral movements did not go away.    Neuropathy- he takes lyrica as well as b12 injections.    Gocovri 37mg 2 caps at night - helped dyskinesia.      past- he tried Amantadine - did not help. he tried 100mg once daily caused stomach issues.      He was on Sinemet 1 tab TID - initially had great response.      PMH complicated with HTN, DM2, CAD (on ASA daily LD 6/1), polyneuropathy, B12 deficiency, NPH (s/p RF VPS 6/2020w/ improvement, presented to [facility] obtunded 12/2020, s/p EVD placement, prolonged   hospital course, s/p RF VPS replacement- managed with Dr. De Jesus- seen by him few weeks ago and increased flow to the  shunt.     wife thinks the speech changes occurred after December and his shunt complication.      has tremors for since early 2020. occur at rest. L more than R hand.   admitted to  [facility] for persistent confusion, drooling, and dysphagia.     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   seen by Ophthalmology-Dr. Heraclio Delatorre and ophtho fellow last week.          neuropsych testing April 2019-showed a mixed picture. He did not have dramatic improvement after large volume LP. on ddx was LBD vs. FTLD.   wife states he has a flat affect but memory is pretty stable. walking is okay. no falls.   most noticeable recent changes are the speech and swallowing changes.      He first saw Dr. Alcazar in July 2018 and Feb 2019 for ELAINE and then neuropathy, had brain imaging then went to [facility] for evaluation for NPH.     MRI brain june 1 2021 showed repositioned R  shunt, showed aqueductal stenosis.       December 2019 he became obtunded /lethargic and was admitted to [facility] where he was found to have a shunt failure. He had a revision surgery and was discharged with gradual improvements and ability to ambulate independently but states that cognitively he never returned to baseline.   Since, he has had a slowly progressive decline in his ability to swallow. His wife states that he coughs after he eats and drinks, his voice is softer, having a lot of drooling      FH: mother had dementia in her 80s.   Review of Systems   Constitutional:  Negative for fever.   HENT:  Negative for ear pain.    Eyes:  Negative for pain.   Neurological:  Negative for syncope and headaches.   Psychiatric/Behavioral:  Negative for hallucinations and self-injury.    All other systems reviewed and are negative.    Patient Active Problem List   Diagnosis    Benign hypertension    Cerebral ventriculomegaly    Hydrocephalus (CMS/HCC)    Cervical myelopathy (CMS/HCC)    Chest tightness    Coronary artery disease    Dysphagia    Dysphonia    Nuclear sclerosis    Hyperlipidemia    Palsy of conjugate gaze     Paresthesia of both feet    Parkinsonism    Presence of stent in coronary artery    Sialorrhea    Excessive daytime sleepiness    Fever    Headache    ELAINE (obstructive sleep apnea)    Runny nose    Sinus bradycardia    Snoring    Unsteady gait    Vitamin B12 deficiency    Oral dyskinesia     Past Medical History:   Diagnosis Date    (Idiopathic) normal pressure hydrocephalus (CMS/[facility]) 07/17/2021    NPH (normal pressure hydrocephalus)    Atherosclerotic heart disease of native coronary artery without angina pectoris 06/20/2022    Coronary artery disease    Disease of spinal cord, unspecified (CMS/[facility]) 07/15/2021    Cervical myelopathy    Obstructive sleep apnea (adult) (pediatric) 07/15/2021    Obstructive sleep apnea, adult    Obstructive sleep apnea (adult) (pediatric) 02/07/2019    ELAINE (obstructive sleep apnea)    Other hyperlipidemia 07/15/2021    Other hyperlipidemia    Parkinson's disease (CMS/[facility]) 11/29/2022    Parkinsonism    Personal history of other diseases of the circulatory system 07/15/2021    History of coronary artery disease    Personal history of other diseases of the circulatory system 07/15/2021    History of hypertension    Personal history of other diseases of the circulatory system     History of abnormal electrocardiography    Personal history of other diseases of the musculoskeletal system and connective tissue     History of backache    Personal history of other endocrine, nutritional and metabolic disease 07/15/2021    History of type 2 diabetes mellitus    Personal history of other specified conditions 06/01/2018    History of snoring    Personal history of other specified conditions 07/15/2021    History of chest pain     Past Surgical History:   Procedure Laterality Date    CARDIAC CATHETERIZATION  07/17/2021    Cardiac Cath Procedure Outcome:    OTHER SURGICAL HISTORY  07/17/2021    Ventriculoperitoneal shunt creation     Social History     Tobacco Use    Smoking status: Not on file     Smokeless tobacco: Not on file   Substance Use Topics    Alcohol use: Not on file     family history includes Coronary artery disease in his mother; Stroke in his father.    Current Outpatient Medications:     amantadine HCL (Gocovri) 137 mg capsule,extended release 24hr, Take by mouth. Take per directed, Disp: , Rfl:     atorvastatin (Lipitor) 20 mg tablet, Take 1 tablet (20 mg) by mouth once daily., Disp: , Rfl:     carbidopa-levodopa (Sinemet)  mg tablet, Take by mouth. Take per directed, Disp: , Rfl:     cyanocobalamin (Vitamin B-12) 1,000 mcg/mL injection, Take per directed, Disp: , Rfl:     folic acid (Folvite) 1 mg tablet, Take 1 tablet (1 mg) by mouth once daily., Disp: , Rfl:     losartan (Cozaar) 50 mg tablet, Take 1 tablet (50 mg) by mouth once daily., Disp: , Rfl:     metFORMIN (Glucophage) 500 mg tablet, Take 1 tablet (500 mg) by mouth 2 times a day with meals., Disp: , Rfl:     metoprolol succinate XL (Toprol-XL) 50 mg 24 hr tablet, Take 1 tablet (50 mg) by mouth once daily., Disp: , Rfl:     mirabegron (Myrbetriq) 50 mg tablet extended release 24 hr 24 hr tablet, Take 1 tablet (50 mg) by mouth once daily., Disp: , Rfl:     ondansetron ODT (Zofran-ODT) 4 mg disintegrating tablet, Take 1 tablet (4 mg) by mouth every 8 hours if needed for nausea or vomiting., Disp: , Rfl:     pregabalin (Lyrica) 75 mg capsule, Take by mouth twice a day. Take per directed, Disp: , Rfl:     triamcinolone (Kenalog) 0.1 % cream, Apply topically 2 times a day. Take per directed, Disp: , Rfl:     triamterene-hydrochlorothiazid (Maxzide-25) 37.5-25 mg tablet, Take by mouth. Take per directed, Disp: , Rfl:   No Known Allergies  There were no vitals taken for this visit.  Neurological Exam/Physical Exam:      Constitutional: General appearance: no acute distress   Auscultation of Heart: Regular rate and rhythm, no murmurs, normal S1 and S2.   Carotid Arteries: Intact without any bruits   Peripheral Vascular Exam: Pulses +2  "and equal in all extremities. Mild left leg swelling.   Mental status: The patient was in no distress, alert, interactive and cooperative. Affect is appropriate.   Orientation: oriented to person, oriented to place and oriented to time.   Language: normal comprehension and no difficulty naming common objects.   Eyes: The ophthalmoscopic examination was normal. The fundi are visualized with normal disc margins and without hemorrhages   Cranial nerve II: Visual fields full to confrontation.   Cranial nerves III, IV, and VI: Abnormal . unable to look upgaze.   Cranial Nerve V: Facial sensation intact bilaterally.   Cranial nerve VII: Normal and symmetric facial strength.   Cranial nerve VIII: Hearing is intact bilaterally to finger rub / whisper.   Cranial nerves IX and X: Palate elevates symmetrically.   Cranial nerve XI: Shoulder shrug and neck rotation strength are intact.   Cranial nerve XII: Tongue midline with normal strength.   Motor: Muscle strength was 5/5 throughout.  very mildongue / mouth dyskinesia. tremors of R hand at rest at times mild tremulousness in hands. mild bradykinesia. mild rigidity in L >R side.   Deep Tendon Reflexes: left biceps 2+ , right biceps 2+, left triceps 2+, right triceps 2+, left brachioradialis 3+, right brachioradialis 3+, left patella 3+, right patella 3+, left ankle jerk 3+, right ankle jerk 3+   Plantar Reflex: Toes downgoing to plantar stimulation on the left. Toes downgoing to plantar stimulation on the right.   Sensory Exam: Sensory Exam was abnormal. dec vibration in feet b/l.   Coordination: There is no limb dystaxia and rapid alternating movements are intact.   Gait: Abnormal . mildly wide based gait.        Labs:  No components found for: \"THRYOIDSTIMU\"  CBC:   Lab Results   Component Value Date    WBC 5.3 06/01/2021    HGB 16.1 06/01/2021    HCT 44.5 06/01/2021     06/01/2021     BMP:   Lab Results   Component Value Date     (L) 06/01/2021    K 3.3 (L) " 06/01/2021    CL 95 (L) 06/01/2021    CO2 26 06/01/2021    BUN 16 06/01/2021    CREATININE 1.19 06/01/2021    CALCIUM 10.1 06/01/2021     LFT:   Lab Results   Component Value Date    ALKPHOS 69 12/03/2020    BILITOT 1.0 12/03/2020    PROT 7.1 12/03/2020    ALBUMIN 4.6 12/03/2020    ALT 28 12/03/2020    AST 21 12/03/2020         Assessment/Plan   Problem List Items Addressed This Visit             ICD-10-CM    Hydrocephalus (CMS/[facility]) G91.9    Parkinsonism - Primary G20.C    with NPH, parkinsonism, speech/bulbar symptoms 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   More tremors - sleep is poor and may be contributing.   he failed amantadine - could not tolerate.   NPH likely contributing.  c/w rytary 95mg 3 caps TID and gocovri.   Sleep referral for insomnia. On very high dose trazodone   left leg swelling - talk to PCP for new leg swelling.    This is a chronic neurologic condition that requires ongoing care and monitoring. This is a complex, serious condition that needs long term care going forward. Between myself and the patient we will be changing direction of care depending on responses to treatment.   Today we discussed medication options, non medication options for management and various other symptoms that are in relation to this disease.  I will continue to be involved in the care of this patient.

## 2024-11-05 DIAGNOSIS — B00.9 HERPES SIMPLEX: ICD-10-CM

## 2024-11-05 RX ORDER — VALACYCLOVIR HYDROCHLORIDE 500 MG/1
500 TABLET, FILM COATED ORAL AS NEEDED
Qty: 40 TABLET | Refills: 1 | Status: SHIPPED | OUTPATIENT
Start: 2024-11-05 | End: 2025-11-05

## 2024-11-06 ENCOUNTER — TREATMENT (OUTPATIENT)
Dept: PHYSICAL THERAPY | Facility: CLINIC | Age: 64
End: 2024-11-06
Payer: COMMERCIAL

## 2024-11-06 DIAGNOSIS — M54.50 ACUTE RIGHT-SIDED LOW BACK PAIN WITHOUT SCIATICA: Primary | ICD-10-CM

## 2024-11-06 PROCEDURE — 97110 THERAPEUTIC EXERCISES: CPT | Mod: GP | Performed by: PHYSICAL THERAPIST

## 2024-11-06 NOTE — PROGRESS NOTES
Physical Therapy Follow-up    Patient Name: Delfino Newberry  MRN: [id number]  Today's Date: 11/6/2024  Time Calculation  Start Time: 0458  Stop Time: 0524  Time Calculation (min): 26 min      Visit#: 2. Med necessity approved.  Cert dates: NA    Precautions: None.    Subjective: 0/10 resting pain today. No back pain since last here. All function wnl. Thinks he is ready for trial of self management.     Objective: AROM LS flex, ext and bilat SB all wnl.     Treatment:   Therapeutic exercise: Hooklying trunk rot, pelvic tilts, bent knee fall-outs- all demo'd correctly with min cuing.   2-leg bridge- HEP 2x10, 2x/day.   Sidelying leg lifts- HEP 20x, 2x/day.     Other: Reviewed neutral spine position, lifting mechanics.     Assessment: Demo's HEP correctly with cuing. All goals achieve. Read for trial of self management.     Plan: Hold PT for trial of self management. If no contact in 4 wks, discharge with HEP.

## 2024-11-13 DIAGNOSIS — G95.9 CERVICAL MYELOPATHY: ICD-10-CM

## 2024-11-13 RX ORDER — PREGABALIN 75 MG/1
75 CAPSULE ORAL 2 TIMES DAILY
Qty: 60 CAPSULE | Refills: 1 | Status: SHIPPED | OUTPATIENT
Start: 2024-11-13

## 2024-11-25 ENCOUNTER — HOSPITAL ENCOUNTER (OUTPATIENT)
Dept: RADIOLOGY | Facility: HOSPITAL | Age: 64
Discharge: HOME | End: 2024-11-25
Payer: COMMERCIAL

## 2024-11-25 ENCOUNTER — APPOINTMENT (OUTPATIENT)
Dept: PRIMARY CARE | Facility: CLINIC | Age: 64
End: 2024-11-25
Payer: COMMERCIAL

## 2024-11-25 VITALS
TEMPERATURE: 97.1 F | OXYGEN SATURATION: 97 % | SYSTOLIC BLOOD PRESSURE: 123 MMHG | BODY MASS INDEX: 24.14 KG/M2 | DIASTOLIC BLOOD PRESSURE: 84 MMHG | WEIGHT: 163 LBS | HEIGHT: 69 IN | HEART RATE: 62 BPM

## 2024-11-25 DIAGNOSIS — R60.0 LOCALIZED EDEMA: ICD-10-CM

## 2024-11-25 DIAGNOSIS — R22.42 LOCALIZED SWELLING OF LEFT FOOT: Primary | ICD-10-CM

## 2024-11-25 LAB
ALBUMIN SERPL BCP-MCNC: 4.3 G/DL (ref 3.4–5)
ALP SERPL-CCNC: 67 U/L (ref 33–136)
ALT SERPL W P-5'-P-CCNC: 8 U/L (ref 10–52)
ANION GAP SERPL CALC-SCNC: 12 MMOL/L (ref 10–20)
AST SERPL W P-5'-P-CCNC: 14 U/L (ref 9–39)
BILIRUB SERPL-MCNC: 0.9 MG/DL (ref 0–1.2)
BUN SERPL-MCNC: 20 MG/DL (ref 6–23)
CALCIUM SERPL-MCNC: 9.1 MG/DL (ref 8.6–10.6)
CHLORIDE SERPL-SCNC: 108 MMOL/L (ref 98–107)
CO2 SERPL-SCNC: 26 MMOL/L (ref 21–32)
CREAT SERPL-MCNC: 1.4 MG/DL (ref 0.5–1.3)
EGFRCR SERPLBLD CKD-EPI 2021: 56 ML/MIN/1.73M*2
GLUCOSE SERPL-MCNC: 137 MG/DL (ref 74–99)
POTASSIUM SERPL-SCNC: 4 MMOL/L (ref 3.5–5.3)
PROT SERPL-MCNC: 6.1 G/DL (ref 6.4–8.2)
SODIUM SERPL-SCNC: 142 MMOL/L (ref 136–145)

## 2024-11-25 PROCEDURE — 3008F BODY MASS INDEX DOCD: CPT | Performed by: NURSE PRACTITIONER

## 2024-11-25 PROCEDURE — 3048F LDL-C <100 MG/DL: CPT | Performed by: NURSE PRACTITIONER

## 2024-11-25 PROCEDURE — 73610 X-RAY EXAM OF ANKLE: CPT | Mod: LT

## 2024-11-25 PROCEDURE — 4010F ACE/ARB THERAPY RXD/TAKEN: CPT | Performed by: NURSE PRACTITIONER

## 2024-11-25 PROCEDURE — 99214 OFFICE O/P EST MOD 30 MIN: CPT | Performed by: NURSE PRACTITIONER

## 2024-11-25 PROCEDURE — 1036F TOBACCO NON-USER: CPT | Performed by: NURSE PRACTITIONER

## 2024-11-25 PROCEDURE — 73630 X-RAY EXAM OF FOOT: CPT | Mod: LEFT SIDE | Performed by: RADIOLOGY

## 2024-11-25 PROCEDURE — 73630 X-RAY EXAM OF FOOT: CPT | Mod: LT

## 2024-11-25 PROCEDURE — 3079F DIAST BP 80-89 MM HG: CPT | Performed by: NURSE PRACTITIONER

## 2024-11-25 PROCEDURE — 3074F SYST BP LT 130 MM HG: CPT | Performed by: NURSE PRACTITIONER

## 2024-11-25 PROCEDURE — 80053 COMPREHEN METABOLIC PANEL: CPT

## 2024-11-25 PROCEDURE — 3044F HG A1C LEVEL LT 7.0%: CPT | Performed by: NURSE PRACTITIONER

## 2024-11-25 PROCEDURE — 3062F POS MACROALBUMINURIA REV: CPT | Performed by: NURSE PRACTITIONER

## 2024-11-25 PROCEDURE — 73610 X-RAY EXAM OF ANKLE: CPT | Mod: LEFT SIDE | Performed by: RADIOLOGY

## 2024-11-25 ASSESSMENT — PAIN SCALES - GENERAL: PAINLEVEL_OUTOF10: 0-NO PAIN

## 2024-11-25 ASSESSMENT — ENCOUNTER SYMPTOMS
WHEEZING: 0
COUGH: 0
ARTHRALGIAS: 0
MYALGIAS: 0
SHORTNESS OF BREATH: 0
JOINT SWELLING: 1
CONSTITUTIONAL NEGATIVE: 1

## 2024-11-25 NOTE — PATIENT INSTRUCTIONS
Please have x-ray done of your left ankle and foot.  Please watch the sodium in your diet.  Please buy and wear compression socks and elevate legs when you are sitting.  Follow-up if no improvement.

## 2024-11-25 NOTE — PROGRESS NOTES
"Subjective   Patient ID: Delfino Newberry is a 64 y.o. male who presents for Establish Care (Left foot swelling).    HPI     Patient of Dr. Heraclio Crespo presents to clinic for evaluation of left ankle foot swelling x 1 month.      Patient with past medical history of hypertension, CAD, hyperlipidemia, type 2 diabetes, Hydrocephalus parkinsonism, paresthesia of both feet.    He is unsure of any trauma or injury to the foot.   He states he was seen by podiatry 3 weeks ago for foot pain he states podiatrist was aware of the swelling in his foot.  He was prescribed prednisone which helped the pain.  Today he denies pain with full weightbearing.  He also denies redness or warmth at the site.    Review of Systems   Constitutional: Negative.    Respiratory:  Negative for cough, shortness of breath and wheezing.    Musculoskeletal:  Positive for joint swelling. Negative for arthralgias and myalgias.   All other systems reviewed and are negative.      Objective   /84 (BP Location: Left arm, Patient Position: Standing, BP Cuff Size: Adult)   Pulse 62   Temp 36.2 °C (97.1 °F) (Temporal)   Ht 1.753 m (5' 9\")   Wt 73.9 kg (163 lb)   SpO2 97%   BMI 24.07 kg/m²     Physical Exam  Vitals reviewed.   Constitutional:       Appearance: Normal appearance. He is not ill-appearing or toxic-appearing.   Cardiovascular:      Rate and Rhythm: Normal rate and regular rhythm.   Pulmonary:      Effort: Pulmonary effort is normal.      Breath sounds: Normal breath sounds.   Musculoskeletal:      Comments: Left ankle and left foot only with +1 pitting edema- Not warm to touch no erythema or bruising noted.  Negative calf tenderness.  Pedal pulse palpable. Left ankle foot without vein swelling.     Skin:     General: Skin is warm and dry.      Findings: No bruising or erythema.   Neurological:      Mental Status: He is alert and oriented to person, place, and time.         Assessment/Plan   Diagnoses and all orders for this " visit:  Localized swelling of left foot  -     Comprehensive metabolic panel  -     XR ankle left 3+ views; Future  -Advise compression socks  -Doppler ultrasound LLE if no improvement.

## 2024-11-26 ENCOUNTER — HOSPITAL ENCOUNTER (OUTPATIENT)
Dept: VASCULAR MEDICINE | Facility: CLINIC | Age: 64
Discharge: HOME | End: 2024-11-26
Payer: COMMERCIAL

## 2024-11-26 DIAGNOSIS — R60.0 BILATERAL LOWER EXTREMITY EDEMA: ICD-10-CM

## 2024-11-26 DIAGNOSIS — M79.605 LEG PAIN, BILATERAL: Primary | ICD-10-CM

## 2024-11-26 DIAGNOSIS — I87.1 VENOUS OBSTRUCTION: ICD-10-CM

## 2024-11-26 DIAGNOSIS — M79.89 OTHER SPECIFIED SOFT TISSUE DISORDERS: ICD-10-CM

## 2024-11-26 DIAGNOSIS — M79.604 LEG PAIN, BILATERAL: Primary | ICD-10-CM

## 2024-11-26 DIAGNOSIS — M79.605 LEG PAIN, BILATERAL: ICD-10-CM

## 2024-11-26 DIAGNOSIS — R59.0 LYMPHADENOPATHY, ABDOMINAL: ICD-10-CM

## 2024-11-26 DIAGNOSIS — M79.604 LEG PAIN, BILATERAL: ICD-10-CM

## 2024-11-26 PROCEDURE — 93970 EXTREMITY STUDY: CPT | Performed by: STUDENT IN AN ORGANIZED HEALTH CARE EDUCATION/TRAINING PROGRAM

## 2024-11-26 PROCEDURE — 93970 EXTREMITY STUDY: CPT

## 2024-11-28 DIAGNOSIS — B00.9 HERPES SIMPLEX: ICD-10-CM

## 2024-12-02 RX ORDER — VALACYCLOVIR HYDROCHLORIDE 500 MG/1
500 TABLET, FILM COATED ORAL AS NEEDED
Qty: 90 TABLET | Refills: 1 | Status: SHIPPED | OUTPATIENT
Start: 2024-12-02 | End: 2025-12-02

## 2024-12-06 ENCOUNTER — OFFICE VISIT (OUTPATIENT)
Dept: HEMATOLOGY/ONCOLOGY | Facility: CLINIC | Age: 64
End: 2024-12-06
Payer: COMMERCIAL

## 2024-12-06 ENCOUNTER — LAB (OUTPATIENT)
Dept: LAB | Facility: CLINIC | Age: 64
End: 2024-12-06
Payer: COMMERCIAL

## 2024-12-06 VITALS
SYSTOLIC BLOOD PRESSURE: 124 MMHG | DIASTOLIC BLOOD PRESSURE: 74 MMHG | BODY MASS INDEX: 24.58 KG/M2 | TEMPERATURE: 97.9 F | RESPIRATION RATE: 20 BRPM | WEIGHT: 166.45 LBS | HEART RATE: 50 BPM

## 2024-12-06 DIAGNOSIS — E53.8 VITAMIN B12 DEFICIENCY: ICD-10-CM

## 2024-12-06 DIAGNOSIS — D64.9 ANEMIA, UNSPECIFIED TYPE: ICD-10-CM

## 2024-12-06 DIAGNOSIS — D72.819 LEUKOPENIA, UNSPECIFIED TYPE: Primary | ICD-10-CM

## 2024-12-06 DIAGNOSIS — D72.819 LEUKOPENIA, UNSPECIFIED TYPE: ICD-10-CM

## 2024-12-06 LAB
BASOPHILS # BLD AUTO: 0.04 X10*3/UL (ref 0–0.1)
BASOPHILS NFR BLD AUTO: 0.9 %
EOSINOPHIL # BLD AUTO: 0.12 X10*3/UL (ref 0–0.7)
EOSINOPHIL NFR BLD AUTO: 2.6 %
ERYTHROCYTE [DISTWIDTH] IN BLOOD BY AUTOMATED COUNT: 12.9 % (ref 11.5–14.5)
HCT VFR BLD AUTO: 37.3 % (ref 41–52)
HGB BLD-MCNC: 12.4 G/DL (ref 13.5–17.5)
IMM GRANULOCYTES # BLD AUTO: 0.03 X10*3/UL (ref 0–0.7)
IMM GRANULOCYTES NFR BLD AUTO: 0.6 % (ref 0–0.9)
LYMPHOCYTES # BLD AUTO: 0.86 X10*3/UL (ref 1.2–4.8)
LYMPHOCYTES NFR BLD AUTO: 18.4 %
MCH RBC QN AUTO: 32.4 PG (ref 26–34)
MCHC RBC AUTO-ENTMCNC: 33.2 G/DL (ref 32–36)
MCV RBC AUTO: 97 FL (ref 80–100)
MONOCYTES # BLD AUTO: 0.37 X10*3/UL (ref 0.1–1)
MONOCYTES NFR BLD AUTO: 7.9 %
NEUTROPHILS # BLD AUTO: 3.25 X10*3/UL (ref 1.2–7.7)
NEUTROPHILS NFR BLD AUTO: 69.6 %
NRBC BLD-RTO: 0 /100 WBCS (ref 0–0)
PLATELET # BLD AUTO: 154 X10*3/UL (ref 150–450)
RBC # BLD AUTO: 3.83 X10*6/UL (ref 4.5–5.9)
WBC # BLD AUTO: 4.7 X10*3/UL (ref 4.4–11.3)

## 2024-12-06 PROCEDURE — 1036F TOBACCO NON-USER: CPT | Performed by: INTERNAL MEDICINE

## 2024-12-06 PROCEDURE — 85025 COMPLETE CBC W/AUTO DIFF WBC: CPT

## 2024-12-06 PROCEDURE — 3048F LDL-C <100 MG/DL: CPT | Performed by: INTERNAL MEDICINE

## 2024-12-06 PROCEDURE — 3078F DIAST BP <80 MM HG: CPT | Performed by: INTERNAL MEDICINE

## 2024-12-06 PROCEDURE — 3062F POS MACROALBUMINURIA REV: CPT | Performed by: INTERNAL MEDICINE

## 2024-12-06 PROCEDURE — 99214 OFFICE O/P EST MOD 30 MIN: CPT | Performed by: INTERNAL MEDICINE

## 2024-12-06 PROCEDURE — 3044F HG A1C LEVEL LT 7.0%: CPT | Performed by: INTERNAL MEDICINE

## 2024-12-06 PROCEDURE — 4010F ACE/ARB THERAPY RXD/TAKEN: CPT | Performed by: INTERNAL MEDICINE

## 2024-12-06 PROCEDURE — 36415 COLL VENOUS BLD VENIPUNCTURE: CPT

## 2024-12-06 PROCEDURE — 3074F SYST BP LT 130 MM HG: CPT | Performed by: INTERNAL MEDICINE

## 2024-12-06 ASSESSMENT — PAIN SCALES - GENERAL: PAINLEVEL_OUTOF10: 0-NO PAIN

## 2024-12-06 NOTE — PROGRESS NOTES
Patient ID: : Delfino Newberry            Primary Care Provider: DL Dhaliwal    LOCATION:  [facility]    HEMATOLOGY ONCOLOGY PROBLEMS:  Anemia and leukopenia    CHIEF COMPLAINTS:  Patient is in the clinic today for evaluation of low WBC and RBC counts    HISTORY:  Delfino Newberry is a 64 y.o. male with multiple medical problem who has also been noted to have normocytic anemia.  Blood work from February 2024 showed a hemoglobin of 12 with MCV of 93.  WBC was 3.1 with platelet of 265 K.  Extensive hematological workup done by Dr. Crespo revealed normal vitamin B12, folate, ferritin, iron panel, TSH etc.  Review of the previous blood work revealed normal hemoglobin ranging from 15-16 range over the last many years.  WBC were also normal ranging from 4-6 over the years.  Initial hematological evaluation was unremarkable in April 2024.  A follow-up bone marrow biopsy was essentially unremarkable in August 2024 other than finding of mild dysplastic changes involving the megakaryocytes.  Overall findings were nonspecific and diagnostic for MDS but the possibility of underlying myeloid processes like CCUS or subclinical MDS could not be completely ruled out.  Clinically he is asymptomatic and is currently being followed by close observation    INTERVAL HISTORY:  Patient denies any new complaints other than issues with generalized weakness, fatigue and chronic insomnia.  Of note he has baseline history of Parkinson disease and normal pressure hydrocephalus.  He has a  shunt placed and is being closely followed by neurosurgery team.    PAST MEDICAL HISTORY:  1.  Hypertension  2.  Diabetes mellitus type 2  3.  Parkinson's disease/hydrocephalus.  S/p  shunt  4.  Chronic insomnia  5.  Hyperlipidemia  6.  Coronary artery disease  7.  Peripheral neuropathy  8.  Vitamin D deficiency  9.  Obstructive sleep apnea  10.  History of cataract surgery    SOCIAL HISTORY:    for 32 years and lives in [place].  Non-smoker.  Social alcohol intake.  He work in sales for [employer].  Born and raised in [facility].    FAMILY HISTORY:  Father  at age 92 from natural causes.  Mother  at age 88 from natural causes.  10 siblings.  1 brother  from brain tumor at age 55.  He has 3 daughters all alive and well.  No other family history of bleeding, clotting or malignant disorders in the family history.    REVIEW OF SYSTEMS:   Pertinent finding as per the history above.  All other systems have been reviewed and generally negative and noncontributory.    VITAL SIGNS  BSA: 1.92 meters squared  /74   Pulse 50   Temp 36.6 °C (97.9 °F)   Resp 20   Wt 75.5 kg (166 lb 7.2 oz)   BMI 24.58 kg/m²     PHYSICAL EXAMINATION:  Detailed physical examination not done.    LAB DATA:  Repeat CBC from today shows a white cell count of 4.7 with hemoglobin of 12.4 and platelets of 154.  ANC is 3.2.  Extensive hematological evaluation was completely unremarkable on 2024.    Pathological data:  Bone marrow biopsy left iliac crest [id number] 2024  Histology;  -- MILDLY HYPERCELLULAR BONE MARROW (50%) WITH MATURING TRILINEAGE HEMATOPOIESIS AND 1% BLASTS.  -- MILD DYSPLASTIC CHANGES INVOLVING MEGAKARYOCYTES (10%); see note.    Note: The bone marrow biopsy reveals a mildly hypercellular marrow with trilineage hematopoiesis and mild single lineage (megakaryocyte) dysplasia. Although the morphological findings are not specific and diagnostic for myelodysplastic syndrome (MDS), the possibility of an underlying myeloid process CCUS or MDS) cannot be excluded particularly in the context of the patient's pancytopenia. Genetic/molecular studies, including karyotyping and myeloid NGS panel, have been ordered and will aid in further characterization and diagnosis.    Cytogenetics:   Karyotype 46,XY[20] MALE:   Interpretation : cytogenetic analysis of 20 metaphase cells from this 2024 Bone  Marrow specimen revealed only normal metaphases.    Myeloid Malignancies Panel;  Interpretation: No variants are detected in the listed gene regions. This finding does not exclude the presence of genetic alterations present in gene regions not tested or occurring at a frequency below the limit of detection.     ASSESSMENT / PLAN:  1.  Anemia/leukopenia.  No clear etiology.  Please refer to the details as outlined above. In summary, patient with multiple medical problems who has also been noted to have fairly sudden onset of leukopenia and anemia as detailed above.  Extensive hematological workup has already been completed by Dr. Crespo and was essentially unremarkable as detailed above. Initial hematological workup was unremarkable including metabolic profile, SAI, erythropoietin level, Philomena test, SPEP etc. A follow-up bone marrow biopsy was essentially unremarkable in August 2024 other than finding of mild dysplastic changes involving the megakaryocytes.  Overall findings were nonspecific and diagnostic for MDS but the possibility of underlying myeloid processes like CCUS or subclinical MDS could not be completely ruled out.  Clinically he is asymptomatic and is currently being followed by close observation.    Long discussion with the patient and he is explained about the results of the extensive hematological workup including bone marrow biopsy.  So far there is no conclusive evidence of any baseline clonal disorder.  Clinically he is essentially asymptomatic as far as cytopenias are concerned.  For now we will continue monitoring closely.   He is again advised to take maximum infection prevention precautions.      2.  Follow-up.  He will return to clinic in 6 months.    This note has been transcribed using Dragon voice recognition system and there is a possibility of unintentional typing misprints.

## 2024-12-09 NOTE — PROGRESS NOTES
Patient: Delfino Newberry  : 1960 AGE: 64 y.o. SEX:male   MRN: [id number]   Provider: DL Martinez     Location [facility]   Service Date: 2024     PCP: DL Dhaliwal   Referred by: Patsy Zheng MD          [facility]  New Visit Note      HISTORY OF PRESENT ILLNESS     Delfino Newberry is a 64 y.o. male with a h/o  ELAINE, HTN, DM2, CAD (on ASA daily LD ), polyneuropathy, B12 deficiency, NPH (s/p RF VPS 2020),  s/p RF VPS replacement- managed with Dr. De Jesus, Parkinson's disease, neuropathy   who presents to [facility].    24: NPV, referred by neurology with concerns of insomnia/ELAINE management.  Patient was diagnosed with ELAINE in 2018 by PSG and was using CPAP intermittently for 3 weeks then stopped due to intolerant to PAP, toss and turning frequently . Now struggling with sleep maintenance insomnia for the past 6 months. He was started on trazodone 50mg by his PCP, who increased each month and is now on 400mg nightly. Tried OTC sleep aides, ambien (kind of helpful), melatonin all ineffective. Reports TST 4 hours/night.     takes trazodone at 9PMBed at 11PM, falls asleep around midnight. Often wakes without alarm due to unknown reason around 3:30-4AM, unable to fall back asleep. Sometimes watches TV in bed. Wakes un refreshed but functions well during the day. Still snores but significantly reduced since 30lb weight loss. ----> HSAT ordered, discussed sleep hygiene, reduce trazodone to 200mg nightly for now    SLEEP STUDY HISTORY (personally reviewed raw data such as interpretation report, data sheet, hypnogram, and titration table if available and applicable)  -PSG 2018-showing moderate ELAINE with AHI 15.7, SpO2 emanuel 86% (BMI 30)   -PAP titration 2018-recommend CPAP 7 cm H2O with ResMed N20 medium mask    SLEEP-WAKE SCHEDULE    Sleep Patterns: He does not have a usual  bed partner. In terms of the patient's sleep/wake cycle, he generally gets into bed at approximately 11 PM.  his latency to sleep onset after lights out is . During the night, the patient generally awakens 2-3 times nightly. These awakenings are usually prolonged in duration. Final wake time on weekday mornings is around 3:30-5 AM.    Compared to weekdays, the patient's sleep schedule is  similar on the weekends.    Breathing during sleep: snoring  Behaviors at night: No   Sleep paralysis: No   Hypnogogic or hypnopompic hallucinations: No   Cataplexy: No     Leg symptoms and timing:  - Sensations: Patient does not have unusual sensations in their extremities that cause an urge to move them   - Movement: Patient has not been told that their legs kick or jerk during sleep    Daytime Symptoms:  On awakening patient reports: wake unrefreshed, feels sleepy, morning dry mouth, and hard to get out of bed  Patient report some daytime symptoms including: DAYTIME SYMPTOMS: reports sleep inertia    Sleep environment:  Preferred sleep position: side  Room is dark: Yes  Room is quiet: Yes  Room is cool: Yes  Bed comfort: good    SLEEP HABITS  Caffeine consumption: Yes, 1-2 cups/day  Alcohol consumption: Yes, rarely (occasional)  Smoking: No  Marijuana: No  Sleep aids: trazodone 400 mg     WEIGHT: lost 30lbs in 6 months     ESS: 2  ASHER: 23    REVIEW OF SYSTEMS     All other systems have been reviewed and are negative.    ALLERGIES     Allergies   Allergen Reactions    Naproxen Sodium Rash    Lisinopril Cough    Nsaids (Non-Steroidal Anti-Inflammatory Drug) Unknown    Plavix [Clopidogrel] Rash       MEDICATIONS     Current Outpatient Medications   Medication Sig Dispense Refill    amantadine HCL (Gocovri) 137 mg capsule,extended release 24hr Take 2 caps at bedtime 60 capsule 11    aspirin 81 mg EC tablet Take 1 tablet (81 mg) by mouth once daily.      atorvastatin (Lipitor) 20 mg tablet TAKE ONE TABLET BY MOUTH EVERY DAY 90  "tablet 3    celecoxib (CeleBREX) 200 mg capsule TAKE 1 CAPSULE BY MOUTH TWICE A DAY 90 capsule 3    cyanocobalamin (Vitamin B-12) 1,000 mcg/mL injection Take per directed      folic acid (Folvite) 1 mg tablet Take 1 tablet (1 mg) by mouth once daily. 90 tablet 3    losartan (Cozaar) 50 mg tablet TAKE 1 TABLET BY MOUTH DAILY 90 tablet 3    metFORMIN (Glucophage) 500 mg tablet Take 1 tablet (500 mg) by mouth 2 times daily (morning and late afternoon).      metoprolol succinate XL (Toprol-XL) 25 mg 24 hr tablet TAKE 1 TABLET BY MOUTH DAILY 90 tablet 3    ondansetron ODT (Zofran-ODT) 4 mg disintegrating tablet Dissolve 1 tablet (4 mg) in the mouth every 8 hours if needed for nausea or vomiting.      pregabalin (Lyrica) 75 mg capsule TAKE 1 CAPSULE (75 MG) BY MOUTH 2 TIMES A DAY. TAKE PER DIRECTED 60 capsule 1    Rytary 23.75-95 mg capsule TAKE 3 CAPSULES BY MOUTH 3 TIMES A  capsule 3    traZODone (Desyrel) 100 mg tablet Take 4 tablets (400 mg) by mouth as needed at bedtime for sleep. 120 tablet 3    valACYclovir (Valtrex) 500 mg tablet Take 1 tablet (500 mg) by mouth if needed (blisters). 90 tablet 1     No current facility-administered medications for this visit.       PAST HISTORIES     PERTINENT PAST MEDICAL HISTORY: See HPI    PERTINENT PAST SURGICAL HISTORY for Sleep Medicine:  non-contributory    PERTINENT FAMILY HISTORY for Sleep Medicine:  sleep apnea- sisters x 2    PERTINENT SOCIAL HISTORY:  He  reports that he has never smoked. He has never used smokeless tobacco. He reports current alcohol use. He reports that he does not use drugs. He currently lives with spouse.    Active Problems, Allergy List, Medication List, and PMH/PSH/FH/Social Hx have been reviewed and reconciled in chart. No significant changes unless documented in the pertinent chart section. Updates made when necessary.     PHYSICAL EXAM     VITAL SIGNS: /72   Pulse 58   Temp 36.1 °C (96.9 °F)   Ht 1.753 m (5' 9\")   Wt 72.6 kg " (160 lb)   SpO2 100%   BMI 23.63 kg/m²     CURRENT WEIGHT:   Vitals:    12/16/24 1537   Weight: 72.6 kg (160 lb)      PREVIOUS WEIGHTS:  Wt Readings from Last 3 Encounters:   12/16/24 72.6 kg (160 lb)   12/06/24 75.5 kg (166 lb 7.2 oz)   11/25/24 73.9 kg (163 lb)     Physical Exam  Constitutional: Awake, not in distress  Skin: Warm, no rash  Neuro: No tremors, moves all extremities  Psych: alert and oriented to time, place, and person    HEENT:   Tonsils enlargement grade 1+   Airway comments: narrow lateral walls   Tongue scalloping: slight   Modified Mallampati score - 3    RESULTS/DATA     Iron (ug/dL)   Date Value   02/24/2024 95     % Saturation (%)   Date Value   02/24/2024 30     TIBC (ug/dL)   Date Value   02/24/2024 315     Ferritin (ng/mL)   Date Value   02/24/2024 258       Bicarbonate   Date Value Ref Range Status   11/25/2024 26 21 - 32 mmol/L Final       ASSESSMENT/PLAN     Mr. Newberry is a 64 y.o. male and He was referred to the [facility] for evaluation of ELAINE    Problem List, Orders, Assessment, Recommendations:    #ELAINE  -PSG 7/9/2018-showing moderate ELAINE with AHI 15.7, SpO2 emanuel 86%  -PAP titration 7/31/2018-recommend CPAP 7 cm H2O with ResMed N20 medium mask  - intolerant to CPAP in past when he tried intermittently for 3 weeks. Did better with NP than FFM  - Due to high pre-test probability without significant medical comorbidity or possible concomitant sleep disorder, I recommend home sleep apnea testing to evaluate for ELAINE  - Follow up after sleep study to review results and determine plan of care (Will send [employer] message with the results if normal)  -Diet, exercise, and weight loss were emphasized today in clinic, as were non-supine sleep, avoiding alcohol in the late evening, and driving or operating heavy machinery when sleepy.       #Insomnia  -sleep maintenance  - has tried the following meds: ambien, trazodone   - likely multifactorial. Etiology could  include untreated sleep apnea, caffeine, circadian rhythm problems, poor sleepy hygiene.  -counseled on good sleep hygiene practices.  -keep a steady schedule, try to regulate your wake up time. Keep a sleep log.   -please minimize/eliminate caffeine, nicotine, and alcohol  -Avoid daytime naps   -decrease stimulating activity for at least two hours prior to the desired sleep onset time.   -get morning light exposure as much as possible especially when you first wake up.   -decrease light exposure as much as possible in the evening and around bedtime.   -Keep a regular eating schedule.   -Eat a healthy diet and exercise as tolerated.    - decrease trazodone from 400mg to 200mg     #PD  - on meds per neuro, defer management  - Denies dream enhancement behaviors     #HTN  BP Readings from Last 1 Encounters:   12/16/24 136/72     - doing well, asymptomatic, denies any headache, blurry vision, chest pain, palpitation, dizziness, lightheadedness, or syncopal episodes  - discussed at length the impact of untreated ELAINE and BP control  - supportive management: low salt DASH diet (less than 2000 mg sodium intake daily), moderate intensity aerobic exercise at least 30 minutes 5 days per week, reduce stress, quit smoking, limit alcohol, lose weight, and monitor BP once daily  - continue current management and follow-up with PCP       All of patient's questions were answered. He verbalizes understanding and agreement with my assessment and plan.    Disposition    Follow up after sleep study

## 2024-12-11 ENCOUNTER — HOSPITAL ENCOUNTER (OUTPATIENT)
Dept: RADIOLOGY | Facility: HOSPITAL | Age: 64
Discharge: HOME | End: 2024-12-11
Payer: COMMERCIAL

## 2024-12-11 DIAGNOSIS — I87.1 VENOUS OBSTRUCTION: ICD-10-CM

## 2024-12-11 DIAGNOSIS — R59.0 LYMPHADENOPATHY, ABDOMINAL: ICD-10-CM

## 2024-12-11 DIAGNOSIS — R60.0 BILATERAL LOWER EXTREMITY EDEMA: ICD-10-CM

## 2024-12-11 PROCEDURE — 2550000001 HC RX 255 CONTRASTS: Performed by: INTERNAL MEDICINE

## 2024-12-11 PROCEDURE — 74177 CT ABD & PELVIS W/CONTRAST: CPT

## 2024-12-11 PROCEDURE — 74177 CT ABD & PELVIS W/CONTRAST: CPT | Performed by: RADIOLOGY

## 2024-12-16 ENCOUNTER — OFFICE VISIT (OUTPATIENT)
Facility: CLINIC | Age: 64
End: 2024-12-16
Payer: COMMERCIAL

## 2024-12-16 VITALS
WEIGHT: 160 LBS | BODY MASS INDEX: 23.7 KG/M2 | DIASTOLIC BLOOD PRESSURE: 72 MMHG | HEART RATE: 58 BPM | TEMPERATURE: 96.9 F | OXYGEN SATURATION: 100 % | HEIGHT: 69 IN | SYSTOLIC BLOOD PRESSURE: 136 MMHG

## 2024-12-16 DIAGNOSIS — I10 BENIGN HYPERTENSION: ICD-10-CM

## 2024-12-16 DIAGNOSIS — G47.09 OTHER INSOMNIA: Primary | ICD-10-CM

## 2024-12-16 DIAGNOSIS — Z72.821 POOR SLEEP HYGIENE: ICD-10-CM

## 2024-12-16 DIAGNOSIS — G47.33 OSA (OBSTRUCTIVE SLEEP APNEA): ICD-10-CM

## 2024-12-16 DIAGNOSIS — G20.C PARKINSONISM, UNSPECIFIED PARKINSONISM TYPE (MULTI): ICD-10-CM

## 2024-12-16 PROBLEM — G47.30 SLEEP DISORDER BREATHING: Status: ACTIVE | Noted: 2024-12-16

## 2024-12-16 PROCEDURE — 1036F TOBACCO NON-USER: CPT | Performed by: NURSE PRACTITIONER

## 2024-12-16 PROCEDURE — 3008F BODY MASS INDEX DOCD: CPT | Performed by: NURSE PRACTITIONER

## 2024-12-16 PROCEDURE — 3078F DIAST BP <80 MM HG: CPT | Performed by: NURSE PRACTITIONER

## 2024-12-16 PROCEDURE — 4010F ACE/ARB THERAPY RXD/TAKEN: CPT | Performed by: NURSE PRACTITIONER

## 2024-12-16 PROCEDURE — 3062F POS MACROALBUMINURIA REV: CPT | Performed by: NURSE PRACTITIONER

## 2024-12-16 PROCEDURE — 3044F HG A1C LEVEL LT 7.0%: CPT | Performed by: NURSE PRACTITIONER

## 2024-12-16 PROCEDURE — 99204 OFFICE O/P NEW MOD 45 MIN: CPT | Performed by: NURSE PRACTITIONER

## 2024-12-16 PROCEDURE — 3075F SYST BP GE 130 - 139MM HG: CPT | Performed by: NURSE PRACTITIONER

## 2024-12-16 PROCEDURE — 99214 OFFICE O/P EST MOD 30 MIN: CPT | Performed by: NURSE PRACTITIONER

## 2024-12-16 PROCEDURE — 3048F LDL-C <100 MG/DL: CPT | Performed by: NURSE PRACTITIONER

## 2024-12-16 ASSESSMENT — SLEEP AND FATIGUE QUESTIONNAIRES
HOW LIKELY ARE YOU TO NOD OFF OR FALL ASLEEP WHILE WATCHING TV: MODERATE CHANCE OF DOZING
WAKING_TOO_EARLY: SEVERE
HOW LIKELY ARE YOU TO NOD OFF OR FALL ASLEEP WHEN YOU ARE A PASSENGER IN A CAR FOR AN HOUR WITHOUT A BREAK: WOULD NEVER DOZE
ESS-CHAD TOTAL SCORE: 2
DIFFICULTY_FALLING_ASLEEP: SEVERE
SATISFACTION_WITH_CURRENT_SLEEP_PATTERN: VERY DISSATISFIED
HOW LIKELY ARE YOU TO NOD OFF OR FALL ASLEEP WHILE SITTING AND TALKING TO SOMEONE: WOULD NEVER DOZE
SLEEP_PROBLEM_NOTICEABLE_TO_OTHERS: VERY MUCH NOTICEABLE
SLEEP_PROBLEM_INTERFERES_DAILY_ACTIVITIES: SOMEWHAT
HOW LIKELY ARE YOU TO NOD OFF OR FALL ASLEEP WHILE LYING DOWN TO REST IN THE AFTERNOON WHEN CIRCUMSTANCES PERMIT: WOULD NEVER DOZE
HOW LIKELY ARE YOU TO NOD OFF OR FALL ASLEEP WHILE SITTING QUIETLY AFTER LUNCH WITHOUT ALCOHOL: WOULD NEVER DOZE
HOW LIKELY ARE YOU TO NOD OFF OR FALL ASLEEP IN A CAR, WHILE STOPPED FOR A FEW MINUTES IN TRAFFIC: WOULD NEVER DOZE
HOW LIKELY ARE YOU TO NOD OFF OR FALL ASLEEP WHILE SITTING AND READING: WOULD NEVER DOZE
DIFFICULTY_STAYING_ASLEEP: SEVERE
WORRIED_DISTRESSED_DUE_TO_SLEEP: VERY MUCH NOTICEABLE
SITING INACTIVE IN A PUBLIC PLACE LIKE A CLASS ROOM OR A MOVIE THEATER: WOULD NEVER DOZE

## 2024-12-16 NOTE — PATIENT INSTRUCTIONS
[facility] Sleep Medicine   [address]  [facility]  [address] [facility] [facility] [facility] [address]       NAME: Delfino Newberry   DATE: 12/16/24    Your Sleep Provider Today: DL Martinez  Your Primary Care Physician: DL Dhaliwal       DIAGNOSIS:   1. Sleep disorder breathing  Home sleep apnea test (HSAT)      2. Other insomnia  Referral to Adult Sleep Medicine    Home sleep apnea test (HSAT)          Thank you for coming to the Sleep Medicine Clinic today! Your sleep medicine provider today was: DL Martinez Below is a summary of your treatment plan, other important information, and our contact numbers:      TREATMENT PLAN     - Get your sleep study scheduled  - Implement sleep tips below.  - Follow-up in 2 months.  - If not already done, sign up for 'My Chart' and send prescription requests or messages through this    Scheduling a Sleep Study    Your provider ordered a sleep apnea test today. It will usually take 2 - 3 business days for Insurance to approve the order.     Once you test is approved it will be sent to the ordering sleep lab. When the sleep lab is notified of the new order, they will reach out to you to get you scheduled for an in-lab sleep study or to get you scheduled for a pick-up date for your Home Sleep Study Kit (depending on which type of study your provider recommended).    They usually will reach out to you about 1 week after your test has been ordered. Please contact the office at [phone] if you have not heard back from them in 2 weeks after you have seen your provider. If your sleep study was ordered through  [employer] (mail away kit) you can call them at [phone] if you do not hear from them within 2 weeks.     Sleep Testing for sleep apnea: The best and ideal way to check out if you have sleep apnea is to do an overnight sleep study in the sleep laboratory. Alternatively, a home sleep apnea test can  also be done depending on your insurance and risk factors.     If you are having a home sleep apnea test, kindly allot 1 hour during pickup of the testing kit as you will have to complete paperwork and listen to the sleep technician for in-person on-the-spot demonstration and instructions on how to hook up the testing kit at home. Do the test for 1 day and start off with sleeping on your back. If you sleep on your side in the middle of night or you have always been a side or stomach-sleeper, it is ok as long as you have some time on your back and off-back.     If you are having an overnight in-lab sleep study, please make sure to bring toiletries, a comfy pillow, additional warm blankets, and any nighttime medications (include as-needed inhaler, pain pill, etc) that you may regularly take. Also, be sure to eat dinner before you arrive as we generally do not provide meals inside the sleep testing center. Lastly, in order to fall asleep faster in the sleep testing center, we advise patients to wake up 2 hours earlier on the morning of scheduled testing and avoid napping 2 days prior testing. Sometimes, your sleep provider may prescribe a sleep aid to be taken at lights out in the sleep testing center. If you are taking a sleep aid, consider having somebody pick you up after the sleep testing.    Overnight sleep studies may be scheduled on a weekday or weekend. We also perform daytime testing for shift workers on a case-by-case basis.    Once you have booked an appointment to do the sleep study, please contact my office for follow-up visit to discuss results.     Insomnia, or trouble falling asleep or staying asleep, can be caused by many different things such as untreated sleep apnea, anxiety, depression, stress, poor sleep habits, and other medical conditions or medications. The best way to treat insomnia is to treat the cause. In general, we can all benefit from better sleep hygiene (aka good sleep habits). Some  "recommendations to help you improve your sleep so you can fall asleep, stay asleep, and wake up feeling refreshed include:    Keep a routine bedtime and rise time even on weekends and non-work days. This helps your biological clock stay in sync with your sleep needs. If you currently have variable sleep-wake schedule, start off by waking up and getting out of bed the same time every day, even on weekends or non-work days. Whether you have good or poor sleep, waking up at the same time every day can help re-train and reset your body clock.  Go to bed when you are sleepy and not when tired nor before your goal bedtime. Long periods of time in bed will lead to fragmented, shallow, broken sleep.   Use the bed for sleep and intimacy only. Do not watch TV, eat, read or use phone/laptop in bed. Keeping sleep as the only activity in bed will help re-associate bed equals sleep.  Get up when you cannot sleep in 20-30 minutes. When you are unable to sleep, exit the bed and go to another room or chair in bedroom, do something boring, calm, relaxing, distracting, or non-stimulating in dim lighting until you feel sleepy enough to fall back asleep. Avoid stimulating activity or any triggers for mental thinking (e.g. cellphone). Repeat as needed.  Avoid napping during the day to build up sleep pressure so that it won't be hard for you to fall asleep at night. Napping, particularly in the late afternoon or early evening may interfere with your night's sleep. If naps are necessary, limit naps under 15-20 minutes and not later than 3 PM.   Create a \"buffer zone\" or \"wind-down time\". This is a quiet time prior to bedtime, typically at least 30 minutes and perhaps as long as 1-2 hours. During this time, you should do things that are enjoyable, relaxing, and not necessarily goal-oriented like performing relaxation exercises, listening to relaxing music, reading a boring book, dimming the lights, or eating a light bedtime snack like a " glass of milk and banana which can promote sleep. Activities that promote relaxation before sleep is important because these can hep quiet the mind and relax the body to get into the right states for sleep.   Do not worry or plan in bed. If you are worrying, planning, feeling anxious, or cannot shut off your thoughts, get up and stay out of bed until you have quieted your mind. Set up a “scheduled worry time” in the morning or late afternoon to write down your worries and stressors as well as plans for the following day.   Keep your bedroom quiet, dark and cool. Ideal sleeping temperature is 65F. If light bothers you, get a slumber mask or blackout shades. If noise bothers you, put ear plugs or get a white noise machine. Alternatively, you may turn on fan or humidifier to create a constant background noise to eliminate unexpected sounds that would otherwise wake you up in the middle of your sleep.  Keep your bedroom technology free. Avoid TV and electronics 1-2 hours prior to bedtime. Also, turn the clock around to avoid clock-watching. Thoughts of the time ca cause frustration and make it more difficult to go to sleep.   DO NOT TRY TOO HARD TO SLEEP. JUST ALLOW SLEEP TO UNFOLD.  Other things to avoid to help  Avoid  caffeine (including chocolate) intake in the late afternoon and early evening. imit the amount of caffeine and consume before noon.   Avoid smoking 2-3 hours before bedtime. Like caffeine, nicotine in cigarette smoking acts a stimulant.   Avoid alcohol intake 2-3 hours before bedtime. Although alcohol may seem to help you fall asleep more easily as it slows down brain activity, it also disrupts your sleep during the night by causing frequent awakenings as the effect of alcohol wears off. Also, alcohol worsens snoring and sleep apnea  Avoid eating a heavy meal (high-fat, high-carbohydrate, gas-producing foods) at dinner or 2-3 hours before bedtime.    Avoid drinking more than 8 oz liquids in the evening.  Restrict fluids after dinner and void at bedtime.  Avoid physical exercise or hot shower / warm bath within 2 hours of bedtime. Regular exercise can improve sleep quality, but exercising or having a warm bath too close to bedtime can disrupt sleep onset. The best time to exercise to help sleep is in the late afternoon or early evening but not within 2 hours of bedtime. Warm baths can be taken in the evening but not within 2 hours of going to bed.   Avoid sleeping with pets or kids if they appear to bother your sleep and/or sleep quality.    MOST IMPORTANTLY:  BE PATIENT!  BE CONSISTENT!  Your sleep problem developed over time so it will take some time and consistency to return to a more normal sleep pattern. By following the suggestions listed above, you should see gradual sleep improvements over time.    Also you have been recommended to do Cognitive Behavioral Therapy for Insomnia (CBT-I). CBT-I is widely recognized as an effective treatment for a wide range of insomnias. CBT-I is typically made up of a number of components including assessment, behavioral and cognitive interventions, motivational techniques, and relapse prevention skills. An overwhelming amount of evidence suggests that CBT-I is as effective as hypnotics and the newer non-benzodiazepine sleep aides in the acute treatment and is more effective than medication in the long term treatment of insomnia.     IMPORTANT INFORMATION     Call 911 for medical emergencies.  Our offices are generally open from Monday-Friday, 9 am - 5 pm.  If you need to get in touch with me, you may either call me/my team (number is below) or you can use [employer].  If a referral for a test, for CPAP, or for another specialist was made, and you have not heard about scheduling this within a week, please call scheduling at [phone] ([phone]).  If you are unable to make your appointment for clinic or an overnight study, kindly call the office at least 48 hours in advance to cancel  "and reschedule.  If you are on CPAP, please bring your device's card or the device to each clinic appointment.   There are no supporting services by either the sleep doctors or their staff on weekends and Holidays, or after 5 PM on weekdays.     PRESCRIPTIONS     We require 7 days advanced notice for prescription refills. If we do not receive the request in this time, we cannot guarantee that your medication will be refilled in time.      IMPORTANT PHONE NUMBERS     Behavioral Sleep Medicine: [phone]  [employer] ([employer]): [phone]  [employer] (DME): [phone]  [facility] (DME): [phone]    CONTACTING YOUR SLEEP MEDICINE PROVIDER AND SLEEP TEAM      For issues with your machine or mask interface, please call your DME provider first. [employer] stands for durable medical company. [employer] is the company who provides you the machine and/or PAP supplies / accessories.   To schedule, cancel, or reschedule SLEEP STUDY APPOINTMENTS, please call the Main Phone Line at [phone] - option 3.   To schedule, cancel, or reschedule CLINIC APPOINTMENTS, you can do it in \"[employer]\", call [phone] for [facility] office , [phone] for [facility] office to speak with my on site staff, or call the Main Phone Line at [phone] - option 2  For CLINICAL QUESTIONS or MEDICATION REFILLS, please call direct line for Adult Sleep Nurses at [phone].   Lastly, you can also send a message directly to your provider through \"My Chart\", which is the email service through your  Records Account: [email or web address]     Adult Sleep Nurses (Renee De Guzman, HAIDER and Alison Kirkpatrick RN):  For clinical questions and refilling prescriptions: [phone]  Email sleep diaries and other documents at: [email or web address]    Office locations for Megha Vásquez NP:    [address]  [place] [address]  [phone]    [address] [facility].  [address]  [place] [address]  [phone]    [address] [facility] [facility].  [address]  [place], [place] [address] [phone]    OUR SLEEP TESTING LOCATIONS     Our team will contact you to schedule your sleep study, however, you can contact us as follow:  Main Phone Line (scheduling only): [phone]), option 3    Sleep Testing Locations:   [place] (18 years and older): [address] [place] [address], 2nd floor   [place] (18 years and older): [address] [facility]; 4th floor  After hours line: [phone]  [facility] (18 years and older) at [place]: [address] [place] [address]  After hours line: [phone]   [facility] (Main campus: All ages): [facility], 6th floor. After hours line: [phone]   [place] (5 years and older; younger considered on case-by-case basis): [address] [facility]; [employer] [address], [address]. Scheduling  After hours line: [phone]       Here at [facility], we wish you a restful sleep!    Your sleep medicine provider for this visit was: SHIRA Martinez-CNP

## 2025-01-01 NOTE — PROGRESS NOTES
Subjective   Patient ID: Delfino Newberry is a 64 y.o. male PRESENTS FOR A YEARLY CHECK UP ON HIS PROSTATE.   NO H/O PROSTATE CANCER IN THE FAMILY  HPI  Are you experiencing:  Burning on urination -- NO  Pain on urination  -- NO  Urinary frequency --EVERY 2 HOURS   Urinary urgency -- NO  Urge incontinence --NO  Urinary stress incontinence  -- NO   Number of pads used per day -- NO  Enuresis -- NO  Nocturia-- OCC  Hematuria -- NO  Hesitancy -- NO  Post void fullness --  NO    Review of Systems  General-- No C/O fever or chills  Head-- No C/O Dizziness  Eyes-- NO  C/O blurry or double vision  Ears-- No C/O hearing loss  Neck-- Supple  Chest-- No C/O pain or discomfort  Lungs-- No C/O shortness of breath  Abdomen-- No C/O  pain or discomfort, No nausea or vomiting  Back-- No C/O back pain or discomfort  Extremities-- No C/O swelling or pain    Objective   Physical Exam    General-- well developed, well nourished in NAD  Head-- normal cephalic, atraumatic  Eyes-- PERRL, EOM'S FROM,  no  jaundice  Neck-- Supple, without masses  Chest-- Normal bony structure  Abdomen-- soft, non tender, liver spleen not palpable . No supra pubic masses  Back-- no flank masses palpable, no CVA tenderness on palpation or perc;ussion  Lymph nodes-- No inguinal lymphadenopathy noted  Prostate-- 1+, firm, smooth, non tender,without nodules  Testis-- both down, non tender, without masses  Epididymis-- no masses palpable  Scrotum -- no hydrocele noted  Extremities -- Normal muscle mass and tone for the patients age  Neurological-- oriented times three    Assessment/Plan   A:  NORMAL FEELING PROSTATE   MILD VOIDING SXS - TOLERABLE   NO FAMILY H/O PROSTATE CANCER     REMOTE H/O PREMATURE EJACULATION   H/O PARKINSON'S DISEASE - UNDER GOOD CONTROL  P:  REASSURANCE, EDUCATION, SUPPORTIVE CARE RENDERED TO THE PT  OBTAIN ALL OLD RECORDS FROM [facility]  PSA NOW AND IN ONE YEAR  F/ IN ONE YEAR FOR A LUKAS OR SOONER IF SXS OCCUR  Raudel Bullock MD 01/01/25 1:33 PM

## 2025-01-06 ENCOUNTER — PROCEDURE VISIT (OUTPATIENT)
Dept: SLEEP MEDICINE | Facility: HOSPITAL | Age: 65
End: 2025-01-06
Payer: COMMERCIAL

## 2025-01-06 ENCOUNTER — OFFICE VISIT (OUTPATIENT)
Dept: UROLOGY | Facility: CLINIC | Age: 65
End: 2025-01-06
Payer: COMMERCIAL

## 2025-01-06 VITALS
BODY MASS INDEX: 23.7 KG/M2 | HEART RATE: 61 BPM | RESPIRATION RATE: 16 BRPM | DIASTOLIC BLOOD PRESSURE: 82 MMHG | HEIGHT: 69 IN | WEIGHT: 160 LBS | TEMPERATURE: 98.2 F | SYSTOLIC BLOOD PRESSURE: 132 MMHG

## 2025-01-06 DIAGNOSIS — G20.A2 PARKINSON'S DISEASE WITH FLUCTUATING MANIFESTATIONS, UNSPECIFIED WHETHER DYSKINESIA PRESENT: ICD-10-CM

## 2025-01-06 DIAGNOSIS — G47.09 OTHER INSOMNIA: ICD-10-CM

## 2025-01-06 DIAGNOSIS — R35.1 NOCTURIA: ICD-10-CM

## 2025-01-06 DIAGNOSIS — R35.1 BENIGN PROSTATIC HYPERPLASIA WITH NOCTURIA: Primary | ICD-10-CM

## 2025-01-06 DIAGNOSIS — R35.0 FREQUENCY OF MICTURITION: ICD-10-CM

## 2025-01-06 DIAGNOSIS — N40.1 BENIGN PROSTATIC HYPERPLASIA WITH NOCTURIA: Primary | ICD-10-CM

## 2025-01-06 DIAGNOSIS — Z12.5 SCREENING FOR PROSTATE CANCER: ICD-10-CM

## 2025-01-06 PROCEDURE — 1036F TOBACCO NON-USER: CPT | Performed by: UROLOGY

## 2025-01-06 PROCEDURE — 3075F SYST BP GE 130 - 139MM HG: CPT | Performed by: UROLOGY

## 2025-01-06 PROCEDURE — 95806 SLEEP STUDY UNATT&RESP EFFT: CPT | Performed by: INTERNAL MEDICINE

## 2025-01-06 PROCEDURE — 3079F DIAST BP 80-89 MM HG: CPT | Performed by: UROLOGY

## 2025-01-06 PROCEDURE — 99214 OFFICE O/P EST MOD 30 MIN: CPT | Performed by: UROLOGY

## 2025-01-06 PROCEDURE — 4010F ACE/ARB THERAPY RXD/TAKEN: CPT | Performed by: UROLOGY

## 2025-01-06 PROCEDURE — 3008F BODY MASS INDEX DOCD: CPT | Performed by: UROLOGY

## 2025-01-06 SDOH — ECONOMIC STABILITY: FOOD INSECURITY: WITHIN THE PAST 12 MONTHS, YOU WORRIED THAT YOUR FOOD WOULD RUN OUT BEFORE YOU GOT MONEY TO BUY MORE.: NEVER TRUE

## 2025-01-06 SDOH — ECONOMIC STABILITY: FOOD INSECURITY: WITHIN THE PAST 12 MONTHS, THE FOOD YOU BOUGHT JUST DIDN'T LAST AND YOU DIDN'T HAVE MONEY TO GET MORE.: NEVER TRUE

## 2025-01-06 ASSESSMENT — PAIN SCALES - GENERAL: PAINLEVEL_OUTOF10: 0-NO PAIN

## 2025-01-06 ASSESSMENT — ENCOUNTER SYMPTOMS
OCCASIONAL FEELINGS OF UNSTEADINESS: 0
LOSS OF SENSATION IN FEET: 0

## 2025-01-06 NOTE — LETTER
January 8, 2025     Lizzy Nunez, APRN-CNP  [address] [facility] [facility] [address]  [place] [address]  [facility] [address]    Patient: Delfino Newberry   YOB: 1960   Date of Visit: 1/6/2025       Dear Dr. Lizzy Nunez, SHIRA-CNP:    Thank you for referring Delfino Newberry to me for evaluation. Below are my notes for this consultation.  If you have questions, please do not hesitate to call me. I look forward to following your patient along with you.       Sincerely,     Raudel Bullock MD      CC: No Recipients  ______________________________________________________________________________________    Subjective  Patient ID: Delfino Newberry is a 64 y.o. male PRESENTS FOR A YEARLY CHECK UP ON HIS PROSTATE.   NO H/O PROSTATE CANCER IN THE FAMILY  HPI  Are you experiencing:  Burning on urination -- NO  Pain on urination  -- NO  Urinary frequency --EVERY 2 HOURS   Urinary urgency -- NO  Urge incontinence --NO  Urinary stress incontinence  -- NO   Number of pads used per day -- NO  Enuresis -- NO  Nocturia-- OCC  Hematuria -- NO  Hesitancy -- NO  Post void fullness --  NO    Review of Systems  General-- No C/O fever or chills  Head-- No C/O Dizziness  Eyes-- NO  C/O blurry or double vision  Ears-- No C/O hearing loss  Neck-- Supple  Chest-- No C/O pain or discomfort  Lungs-- No C/O shortness of breath  Abdomen-- No C/O  pain or discomfort, No nausea or vomiting  Back-- No C/O back pain or discomfort  Extremities-- No C/O swelling or pain    Objective   Physical Exam    General-- well developed, well nourished in NAD  Head-- normal cephalic, atraumatic  Eyes-- PERRL, EOM'S FROM,  no  jaundice  Neck-- Supple, without masses  Chest-- Normal bony structure  Abdomen-- soft, non tender, liver spleen not palpable . No supra pubic masses  Back-- no flank masses palpable, no CVA tenderness on palpation or perc;ussion  Lymph nodes-- No inguinal lymphadenopathy noted  Prostate-- 1+, firm, smooth, non tender,without nodules  Testis-- both down,  non tender, without masses  Epididymis-- no masses palpable  Scrotum -- no hydrocele noted  Extremities -- Normal muscle mass and tone for the patients age  Neurological-- oriented times three    Assessment/Plan   A:  NORMAL FEELING PROSTATE   MILD VOIDING SXS - TOLERABLE   NO FAMILY H/O PROSTATE CANCER     REMOTE H/O PREMATURE EJACULATION   H/O PARKINSON'S DISEASE - UNDER GOOD CONTROL  P:  REASSURANCE, EDUCATION, SUPPORTIVE CARE RENDERED TO THE PT  OBTAIN ALL OLD RECORDS FROM [facility]  PSA NOW AND IN ONE YEAR  F/ IN ONE YEAR FOR A LUKAS OR SOONER IF SXS OCCUR  Raudel Bullock MD 01/01/25 1:33 PM

## 2025-01-14 DIAGNOSIS — E11.9 CONTROLLED TYPE 2 DIABETES MELLITUS WITHOUT COMPLICATION, WITHOUT LONG-TERM CURRENT USE OF INSULIN (MULTI): ICD-10-CM

## 2025-01-15 ENCOUNTER — TELEPHONE (OUTPATIENT)
Dept: NEUROLOGY | Facility: CLINIC | Age: 65
End: 2025-01-15
Payer: COMMERCIAL

## 2025-01-15 NOTE — TELEPHONE ENCOUNTER
Per pharmacy shows that the Rytary is not covered by patients insurance, is there another medication that you can prescribe?

## 2025-01-16 RX ORDER — FOLIC ACID 1 MG/1
1 TABLET ORAL DAILY
Qty: 90 TABLET | Refills: 3 | Status: SHIPPED | OUTPATIENT
Start: 2025-01-16

## 2025-01-22 ENCOUNTER — LAB (OUTPATIENT)
Dept: LAB | Facility: LAB | Age: 65
End: 2025-01-22
Payer: COMMERCIAL

## 2025-01-22 DIAGNOSIS — Z12.5 SCREENING FOR PROSTATE CANCER: ICD-10-CM

## 2025-01-22 LAB — PSA SERPL-MCNC: 0.35 NG/ML

## 2025-01-22 PROCEDURE — 84153 ASSAY OF PSA TOTAL: CPT

## 2025-02-05 ENCOUNTER — APPOINTMENT (OUTPATIENT)
Facility: CLINIC | Age: 65
End: 2025-02-05
Payer: COMMERCIAL

## 2025-02-05 DIAGNOSIS — G47.33 OSA (OBSTRUCTIVE SLEEP APNEA): Primary | ICD-10-CM

## 2025-02-05 DIAGNOSIS — F51.01 PRIMARY INSOMNIA: ICD-10-CM

## 2025-02-05 DIAGNOSIS — Z72.821 POOR SLEEP HYGIENE: ICD-10-CM

## 2025-02-05 DIAGNOSIS — G20.C PARKINSONISM, UNSPECIFIED PARKINSONISM TYPE (MULTI): ICD-10-CM

## 2025-02-05 DIAGNOSIS — I10 BENIGN HYPERTENSION: ICD-10-CM

## 2025-02-05 DIAGNOSIS — G47.09 OTHER INSOMNIA: ICD-10-CM

## 2025-02-05 RX ORDER — TRAZODONE HYDROCHLORIDE 100 MG/1
300 TABLET ORAL NIGHTLY PRN
Qty: 360 TABLET | Refills: 1 | Status: SHIPPED | OUTPATIENT
Start: 2025-02-05 | End: 2025-06-05

## 2025-02-05 NOTE — PROGRESS NOTES
Patient: Delfino Newberry  : 1960 AGE: 64 y.o. SEX:male   MRN: [id number]   Provider: DL Martinez     Location [facility]   Service Date: 2025     PCP: DL Dhaliwal   Referred by: No ref. provider found          [facility]  Follow Up Visit Note      HISTORY OF PRESENT ILLNESS     Delfino Newberry is a 64 y.o. male with a h/o  ELAINE, HTN, DM2, CAD (on ASA daily LD ), polyneuropathy, B12 deficiency, NPH (s/p RF VPS 2020),  s/p RF VPS replacement- managed with Dr. De Jesus, Parkinson's disease, neuropathy   who presents to [facility].    25: Here to review HSAT results.          24: NPV, referred by neurology with concerns of insomnia/ELAINE management.  Patient was diagnosed with ELAINE in 2018 by PSG and was using CPAP intermittently for 3 weeks then stopped due to intolerant to PAP, toss and turning frequently . Now struggling with sleep maintenance insomnia for the past 6 months. He was started on trazodone 50mg by his PCP, who increased each month and is now on 400mg nightly. Tried OTC sleep aides, ambien (kind of helpful), melatonin all ineffective. Reports TST 4 hours/night.     takes trazodone at 9PMBed at 11PM, falls asleep around midnight. Often wakes without alarm due to unknown reason around 3:30-4AM, unable to fall back asleep. Sometimes watches TV in bed. Wakes un refreshed but functions well during the day. Still snores but significantly reduced since 30lb weight loss. ----> HSAT ordered, discussed sleep hygiene, reduce trazodone to 200mg nightly for now    SLEEP STUDY HISTORY (personally reviewed raw data such as interpretation report, data sheet, hypnogram, and titration table if available and applicable)  -PSG 2018-showing moderate ELAINE with AHI 15.7, SpO2 emanuel 86% (BMI 30)   -PAP titration 2018-recommend CPAP 7 cm H2O with ResMed N20 medium mask    SLEEP-WAKE  SCHEDULE    Sleep Patterns: He does not have a usual bed partner. In terms of the patient's sleep/wake cycle, he generally gets into bed at approximately 11 PM.  his latency to sleep onset after lights out is . During the night, the patient generally awakens 2-3 times nightly. These awakenings are usually prolonged in duration. Final wake time on weekday mornings is around 3:30-5 AM.    Compared to weekdays, the patient's sleep schedule is  similar on the weekends.    Breathing during sleep: snoring  Behaviors at night: No   Sleep paralysis: No   Hypnogogic or hypnopompic hallucinations: No   Cataplexy: No     Leg symptoms and timing:  - Sensations: Patient does not have unusual sensations in their extremities that cause an urge to move them   - Movement: Patient has not been told that their legs kick or jerk during sleep    Daytime Symptoms:  On awakening patient reports: wake unrefreshed, feels sleepy, morning dry mouth, and hard to get out of bed  Patient report some daytime symptoms including: DAYTIME SYMPTOMS: reports sleep inertia    Sleep environment:  Preferred sleep position: side  Room is dark: Yes  Room is quiet: Yes  Room is cool: Yes  Bed comfort: good    SLEEP HABITS  Caffeine consumption: Yes, 1-2 cups/day  Alcohol consumption: Yes, rarely (occasional)  Smoking: No  Marijuana: No  Sleep aids: trazodone 400 mg     WEIGHT: lost 30lbs in 6 months     ESS:    ASHER:      REVIEW OF SYSTEMS     All other systems have been reviewed and are negative.    ALLERGIES     Allergies   Allergen Reactions    Naproxen Sodium Rash    Lisinopril Cough    Losartan Cough    Nsaids (Non-Steroidal Anti-Inflammatory Drug) Unknown    Plavix [Clopidogrel] Rash       MEDICATIONS     Current Outpatient Medications   Medication Sig Dispense Refill    amantadine HCL (Gocovri) 137 mg capsule,extended release 24hr Take 2 caps at bedtime 60 capsule 11    aspirin 81 mg EC tablet Take 1 tablet (81 mg) by mouth once daily.       atorvastatin (Lipitor) 20 mg tablet TAKE ONE TABLET BY MOUTH EVERY DAY 90 tablet 3    celecoxib (CeleBREX) 200 mg capsule TAKE 1 CAPSULE BY MOUTH TWICE A DAY 90 capsule 3    cyanocobalamin (Vitamin B-12) 1,000 mcg/mL injection Take per directed      folic acid (Folvite) 1 mg tablet TAKE 1 TABLET BY MOUTH ONCE DAILY. 90 tablet 3    losartan (Cozaar) 50 mg tablet TAKE 1 TABLET BY MOUTH DAILY (Patient not taking: Reported on 1/6/2025) 90 tablet 3    metFORMIN (Glucophage) 500 mg tablet Take 1 tablet (500 mg) by mouth 2 times daily (morning and late afternoon).      metoprolol succinate XL (Toprol-XL) 25 mg 24 hr tablet TAKE 1 TABLET BY MOUTH DAILY 90 tablet 3    ondansetron ODT (Zofran-ODT) 4 mg disintegrating tablet Dissolve 1 tablet (4 mg) in the mouth every 8 hours if needed for nausea or vomiting.      pregabalin (Lyrica) 75 mg capsule TAKE 1 CAPSULE (75 MG) BY MOUTH 2 TIMES A DAY. TAKE PER DIRECTED 60 capsule 1    Rytary 23.75-95 mg capsule TAKE 3 CAPSULES BY MOUTH 3 TIMES A  capsule 3    traZODone (Desyrel) 100 mg tablet Take 4 tablets (400 mg) by mouth as needed at bedtime for sleep. 120 tablet 3    valACYclovir (Valtrex) 500 mg tablet Take 1 tablet (500 mg) by mouth if needed (blisters). 90 tablet 1     No current facility-administered medications for this visit.       PAST HISTORIES     PERTINENT PAST MEDICAL HISTORY: See HPI    PERTINENT PAST SURGICAL HISTORY for Sleep Medicine:  non-contributory    PERTINENT FAMILY HISTORY for Sleep Medicine:  sleep apnea- sisters x 2    PERTINENT SOCIAL HISTORY:  He  reports that he has never smoked. He has never used smokeless tobacco. He reports current alcohol use. He reports that he does not use drugs. He currently lives with spouse.    Active Problems, Allergy List, Medication List, and PMH/PSH/FH/Social Hx have been reviewed and reconciled in chart. No significant changes unless documented in the pertinent chart section. Updates made when necessary.      PHYSICAL EXAM     VITAL SIGNS: There were no vitals taken for this visit.    CURRENT WEIGHT:   There were no vitals filed for this visit.     PREVIOUS WEIGHTS:  Wt Readings from Last 3 Encounters:   01/06/25 72.6 kg (160 lb)   12/16/24 72.6 kg (160 lb)   12/06/24 75.5 kg (166 lb 7.2 oz)     Constitutional: Alert and oriented, cooperative, no obvious distress   HEENT: Non icteric or anemic, EOM WNL bilaterally   Neck: Supple, no JVD, no goiter, no adenopathy, no rigidity     RESULTS/DATA     Iron (ug/dL)   Date Value   02/24/2024 95     % Saturation (%)   Date Value   02/24/2024 30     TIBC (ug/dL)   Date Value   02/24/2024 315     Ferritin (ng/mL)   Date Value   02/24/2024 258       Bicarbonate   Date Value Ref Range Status   11/25/2024 26 21 - 32 mmol/L Final       ASSESSMENT/PLAN     Mr. Newberry is a 64 y.o. male and He was referred to the [facility] for evaluation of ELAINE    Problem List, Orders, Assessment, Recommendations:    #ELAINE  -PSG 7/9/2018-showing moderate ELAINE with AHI 15.7, SpO2 emanuel 86%  -PAP titration 7/31/2018-recommend CPAP 7 cm H2O with ResMed N20 medium mask  - intolerant to CPAP in past when he tried intermittently for 3 weeks. Did better with NP than FFM  - Due to high pre-test probability without significant medical comorbidity or possible concomitant sleep disorder, I recommend home sleep apnea testing to evaluate for ELAINE  - Follow up after sleep study to review results and determine plan of care (Will send "[employer] message with the results if normal)  -Diet, exercise, and weight loss were emphasized today in clinic, as were non-supine sleep, avoiding alcohol in the late evening, and driving or operating heavy machinery when sleepy.     HSAT 1/6/2025-showing mild ELAINE with AHI 3% 14.2, AHI 4% 6.1, SpO2 emanuel 79.5%      #Insomnia  -sleep maintenance  - has tried the following meds: ambien, trazodone   - likely multifactorial. Etiology could include untreated  sleep apnea, caffeine, circadian rhythm problems, poor sleepy hygiene.  -counseled on good sleep hygiene practices.  -keep a steady schedule, try to regulate your wake up time. Keep a sleep log.   -please minimize/eliminate caffeine, nicotine, and alcohol  -Avoid daytime naps   -decrease stimulating activity for at least two hours prior to the desired sleep onset time.   -get morning light exposure as much as possible especially when you first wake up.   -decrease light exposure as much as possible in the evening and around bedtime.   -Keep a regular eating schedule.   -Eat a healthy diet and exercise as tolerated.    - decrease trazodone from 400mg to 200mg   - AVOID lunesta, doxepin>6mg  -May benefit from rotigotine patch versus pramipexole ; recommend discussing case with Dr. Alcazar     #PD  - on meds per neuro, defer management  - Denies dream enhancement behaviors     #HTN  BP Readings from Last 1 Encounters:   01/06/25 132/82     - doing well, asymptomatic, denies any headache, blurry vision, chest pain, palpitation, dizziness, lightheadedness, or syncopal episodes  - discussed at length the impact of untreated ELAINE and BP control  - supportive management: low salt DASH diet (less than 2000 mg sodium intake daily), moderate intensity aerobic exercise at least 30 minutes 5 days per week, reduce stress, quit smoking, limit alcohol, lose weight, and monitor BP once daily  - continue current management and follow-up with PCP       All of patient's questions were answered. He verbalizes understanding and agreement with my assessment and plan.    Disposition    Follow up after sleep study

## 2025-02-19 ENCOUNTER — APPOINTMENT (OUTPATIENT)
Facility: CLINIC | Age: 65
End: 2025-02-19
Payer: COMMERCIAL

## 2025-02-27 ENCOUNTER — OFFICE VISIT (OUTPATIENT)
Facility: CLINIC | Age: 65
End: 2025-02-27
Payer: COMMERCIAL

## 2025-02-27 DIAGNOSIS — G47.09 OTHER INSOMNIA: ICD-10-CM

## 2025-02-27 DIAGNOSIS — I10 BENIGN HYPERTENSION: ICD-10-CM

## 2025-02-27 DIAGNOSIS — G20.C PARKINSONISM, UNSPECIFIED PARKINSONISM TYPE (MULTI): ICD-10-CM

## 2025-02-27 DIAGNOSIS — G47.33 OSA (OBSTRUCTIVE SLEEP APNEA): Primary | ICD-10-CM

## 2025-02-27 DIAGNOSIS — Z72.821 POOR SLEEP HYGIENE: ICD-10-CM

## 2025-02-27 ASSESSMENT — PATIENT HEALTH QUESTIONNAIRE - PHQ9
1. LITTLE INTEREST OR PLEASURE IN DOING THINGS: NOT AT ALL
SUM OF ALL RESPONSES TO PHQ9 QUESTIONS 1 AND 2: 0
2. FEELING DOWN, DEPRESSED OR HOPELESS: NOT AT ALL

## 2025-02-27 ASSESSMENT — SLEEP AND FATIGUE QUESTIONNAIRES
SITING INACTIVE IN A PUBLIC PLACE LIKE A CLASS ROOM OR A MOVIE THEATER: WOULD NEVER DOZE
ESS-CHAD TOTAL SCORE: 3
HOW LIKELY ARE YOU TO NOD OFF OR FALL ASLEEP WHEN YOU ARE A PASSENGER IN A CAR FOR AN HOUR WITHOUT A BREAK: WOULD NEVER DOZE
HOW LIKELY ARE YOU TO NOD OFF OR FALL ASLEEP WHILE SITTING AND TALKING TO SOMEONE: WOULD NEVER DOZE
HOW LIKELY ARE YOU TO NOD OFF OR FALL ASLEEP WHILE SITTING QUIETLY AFTER LUNCH WITHOUT ALCOHOL: WOULD NEVER DOZE
HOW LIKELY ARE YOU TO NOD OFF OR FALL ASLEEP WHILE SITTING AND READING: WOULD NEVER DOZE
HOW LIKELY ARE YOU TO NOD OFF OR FALL ASLEEP WHILE LYING DOWN TO REST IN THE AFTERNOON WHEN CIRCUMSTANCES PERMIT: SLIGHT CHANCE OF DOZING
HOW LIKELY ARE YOU TO NOD OFF OR FALL ASLEEP IN A CAR, WHILE STOPPED FOR A FEW MINUTES IN TRAFFIC: WOULD NEVER DOZE
HOW LIKELY ARE YOU TO NOD OFF OR FALL ASLEEP WHILE WATCHING TV: MODERATE CHANCE OF DOZING

## 2025-02-27 ASSESSMENT — ENCOUNTER SYMPTOMS
OCCASIONAL FEELINGS OF UNSTEADINESS: 0
DEPRESSION: 0
LOSS OF SENSATION IN FEET: 1

## 2025-02-27 ASSESSMENT — COLUMBIA-SUICIDE SEVERITY RATING SCALE - C-SSRS
6. HAVE YOU EVER DONE ANYTHING, STARTED TO DO ANYTHING, OR PREPARED TO DO ANYTHING TO END YOUR LIFE?: NO
1. IN THE PAST MONTH, HAVE YOU WISHED YOU WERE DEAD OR WISHED YOU COULD GO TO SLEEP AND NOT WAKE UP?: NO
2. HAVE YOU ACTUALLY HAD ANY THOUGHTS OF KILLING YOURSELF?: NO

## 2025-02-27 NOTE — PATIENT INSTRUCTIONS
[facility]  [address] [place] [address]       NAME: Delfino Newberry   DATE: 02/27/25    Your Sleep Provider Today: DL Martinez  Your Primary Care Physician: DL Dhaliwal       DIAGNOSIS:   No diagnosis found.    Thank you for coming to the Sleep Medicine Clinic today! Your sleep medicine provider today was: DL Martinez Below is a summary of your treatment plan, other important information, and our contact numbers:      TREATMENT PLAN     - Follow-up with Dr. Quispe for oral appliance, follow up with me after final adjustments have been made   - If not already done, sign up for 'My Chart' and send prescription requests or messages through this    Oral Appliance Therapy    Oral appliance therapy is a dental device that can be fabricated by a dental sleep medicine specialist. We discussed that we will refer you for an evaluation.    Please schedule an appointment with dentistry to evaluate you for an oral appliance to treat your sleep apnea. Consider the following specialists in the area:     Dr. Virginia Hernandez  [address] [id number]  Phone: [phone]  Website: [email or web address]    Dr. Kevin Angeles [facility]  [address] [address]  Phone: [phone]  Fax: [phone]  Website: [email or web address]     Dr. Zaid Roberts, [facility]  Dental Sleep Apnea Therapy [place]  Zaid Roberts [facility]  [address]  [place] [id number]  Phone: [phone]  Website: [email or web address]    Dr. Jesse Quispe  /[facility]: [phone]    Dr. Norbert Blas  [facility]   [phone]    Dr. Alvin Gutierrez  [place], [place]: [phone]    Dr. Frankie Pierre  [place], [place]: [phone]    Dr. Guillermo Ross  [place], [place]: [phone]    Dr. Rehana Moya, [phone]      Mark Anthony Ramirez, [place]: [phone]    Dr. Heraclio Marie, [place]: [phone]    Dr. Edward Hurst, [place]: [phone]    Dr. Kris Espino, [place]: [phone]    [facility]:  Dr. Esteban Stewart    Oral appliance therapy is typically covered by your medical insurance as sleep apnea is a medical diagnoses and not a dental diagnosis. You can confirm coverage by calling your medical insurance and providing them with the following medical codes:  Diagnosis code: Obstructive Sleep Apnea G47.33  Procedure code: P42212    After your appliance is made and adjusted, we like to make sure it is treating your sleep apnea effectively. Please return to our clinic at that time for follow up.      IMPORTANT INFORMATION     Call 911 for medical emergencies.  Our offices are generally open from Monday-Friday, 9 am - 5 pm.  If you need to get in touch with me, you may either call me/my team (number is below) or you can use [facility].  If a referral for a test, for CPAP, or for another specialist was made, and you have not heard about scheduling this within a week, please call scheduling at [phone]).  If you are unable to make your appointment for clinic or an overnight study, kindly call the office at least 48 hours in advance to cancel and reschedule.  If you are on CPAP, please bring your device's card or the device to each clinic appointment.   There are no supporting services by either the sleep doctors or their staff on weekends and Holidays, or after 5 PM on weekdays.     PRESCRIPTIONS     We require 7 days advanced notice for prescription refills. If we do not receive the request in this time, we cannot guarantee that your medication will be refilled in time.    IMPORTANT PHONE NUMBERS     [facility]: [phone]  [facility] (DME): [phone]  [facility] (DME): [phone]  [facility] (DME):  [phone]    CONTACTING YOUR SLEEP MEDICINE PROVIDER AND SLEEP TEAM      For issues with your machine or mask interface, please call your DME provider first. DME stands for durable medical company. DME is the company who provides you the machine and/or PAP supplies / accessories.   To schedule, cancel, or reschedule SLEEP STUDY APPOINTMENTS, please call the Main Phone Line at [phone] - option 3.   To schedule, cancel, or reschedule CLINIC APPOINTMENTS, you can do it in \"MyChart\", call [phone] for [facility] office to speak with my on site staff, or call the Main Phone Line at [phone] - option 2  For CLINICAL QUESTIONS or MEDICATION REFILLS, please call direct line for Adult Sleep Nurses at [phone].   Lastly, you can also send a message directly to your provider through \"My Chart\", which is the email service through your  Records Account: [email or web address]     Adult Sleep Nurses (Renee De Guzman RN and Alison Kirkpatrick RN):  For clinical questions and refilling prescriptions: [phone]  Email sleep diaries and other documents at: [email or web address]    Office locations for Megha Vásquez NP:    [facility]   [address]  [place] [address] [phone]    [facility]  [address]  [address]  [place], [place] [address] [phone]    OUR SLEEP TESTING LOCATIONS     Our team will contact you to schedule your sleep study, however, you can contact us as follow:  Main Phone Line (scheduling only): [phone]), option 3    Sleep Testing Locations:   [place] (18 years and older): [address] [place] [address], 2nd floor  [facility] (18 years and older): [address]; 4th floor  After hours line: [phone]  [facility] (18 years and older) at [place]: [address] [place] [address]  After hours line: [phone]   [facility] (Main campus: All ages): " [address], 6th floor. After hours line: [phone]   [place] (5 years and older; younger considered on case-by-case basis): [address]; [address], [address]. Scheduling  After hours line: [phone]       Here at [facility], we wish you a restful sleep!    Your sleep medicine provider for this visit was: Megha Vásquez, APRN-CNP

## 2025-02-27 NOTE — PROGRESS NOTES
"   Patient: Delfino Newberry  : 1960 AGE: 64 y.o. SEX:male   MRN: [id number]   Provider: DL Martinez     Location [facility]   Service Date: 2025     PCP: DL Dhaliwal   Referred by: No ref. provider found          [facility]  Follow Up Visit Note    Virtual or Telephone Consent  An interactive audio and video telecommunication system which permits real time communications between the patient (at the originating site) and provider (at the distant site) was utilized to provide this telehealth service.   Verbal consent was requested and obtained from Delfino Newberry on this date, 25 for a telehealth visit and the patient's location was confirmed at the time of the visit.     HISTORY OF PRESENT ILLNESS     Delfino Newberry is a 64 y.o. male with a h/o  ELAINE, HTN, DM2, CAD (on ASA daily LD ), polyneuropathy, B12 deficiency, NPH (s/p RF VPS 2020),  s/p RF VPS replacement- managed with Dr. De Jesus, Parkinson's disease, neuropathy   who presents to [facility].    25: Here to review HSAT results. Sleep remains \"sporadic at best\". Has decreased trazodone to 200mg as recommended at previous office visit. Reports this has helped reduce his daytime sleepiness with de escalation of dose and has not changed his sleep. TST 4 hrs/night. ---> interested in OAT     24: NPV, referred by neurology with concerns of insomnia/ELAINE management.  Patient was diagnosed with ELAINE in 2018 by PSG and was using CPAP intermittently for 3 weeks then stopped due to intolerant to PAP, toss and turning frequently . Now struggling with sleep maintenance insomnia for the past 6 months. He was started on trazodone 50mg by his PCP, who increased each month and is now on 400mg nightly. Tried OTC sleep aides, ambien (kind of helpful), melatonin all ineffective. Reports TST 4 hours/night.     takes trazodone at 9PMBed at " 11PM, falls asleep around midnight. Often wakes without alarm due to unknown reason around 3:30-4AM, unable to fall back asleep. Sometimes watches TV in bed. Wakes un refreshed but functions well during the day. Still snores but significantly reduced since 30lb weight loss. ----> HSAT ordered, discussed sleep hygiene, reduce trazodone to 200mg nightly for now    SLEEP STUDY HISTORY (personally reviewed raw data such as interpretation report, data sheet, hypnogram, and titration table if available and applicable)  -PSG 7/9/2018-showing moderate ELAINE with AHI 15.7, SpO2 emanuel 86% (BMI 30)   -PAP titration 7/31/2018-recommend CPAP 7 cm H2O with ResMed N20 medium mask  - HSAT 1/6/2025-showing mild ELAINE with AHI 3% 14.2, AHI 4% 6.1, SpO2 emanuel 79.5%    SLEEP-WAKE SCHEDULE    Sleep Patterns: He does not have a usual bed partner. In terms of the patient's sleep/wake cycle, he generally gets into bed at approximately 11 PM.  his latency to sleep onset after lights out is . During the night, the patient generally awakens 2-3 times nightly. These awakenings are usually prolonged in duration. Final wake time on weekday mornings is around 3:30-5 AM.    Compared to weekdays, the patient's sleep schedule is  similar on the weekends.    Breathing during sleep: snoring  Behaviors at night: No   Sleep paralysis: No   Hypnogogic or hypnopompic hallucinations: No   Cataplexy: No     Leg symptoms and timing:  - Sensations: Patient does not have unusual sensations in their extremities that cause an urge to move them   - Movement: Patient has not been told that their legs kick or jerk during sleep    Daytime Symptoms:  On awakening patient reports: wake unrefreshed, feels sleepy, morning dry mouth, and hard to get out of bed  Patient report some daytime symptoms including: DAYTIME SYMPTOMS: reports sleep inertia    Sleep environment:  Preferred sleep position: side  Room is dark: Yes  Room is quiet: Yes  Room is cool: Yes  Bed  comfort: good    SLEEP HABITS  Caffeine consumption: Yes, 1-2 cups/day  Alcohol consumption: Yes, rarely (occasional)  Smoking: No  Marijuana: No  Sleep aids: trazodone 400 mg     WEIGHT: lost 30lbs in 6 months     ESS: 3    REVIEW OF SYSTEMS     All other systems have been reviewed and are negative.    ALLERGIES     Allergies   Allergen Reactions    Naproxen Sodium Rash    Lisinopril Cough    Losartan Cough    Nsaids (Non-Steroidal Anti-Inflammatory Drug) Unknown    Plavix [Clopidogrel] Rash       MEDICATIONS     Current Outpatient Medications   Medication Sig Dispense Refill    amantadine HCL (Gocovri) 137 mg capsule,extended release 24hr Take 2 caps at bedtime 60 capsule 11    aspirin 81 mg EC tablet Take 1 tablet (81 mg) by mouth once daily.      atorvastatin (Lipitor) 20 mg tablet TAKE ONE TABLET BY MOUTH EVERY DAY 90 tablet 3    celecoxib (CeleBREX) 200 mg capsule TAKE 1 CAPSULE BY MOUTH TWICE A DAY 90 capsule 3    cyanocobalamin (Vitamin B-12) 1,000 mcg/mL injection Take per directed      folic acid (Folvite) 1 mg tablet TAKE 1 TABLET BY MOUTH ONCE DAILY. 90 tablet 3    losartan (Cozaar) 50 mg tablet TAKE 1 TABLET BY MOUTH DAILY 90 tablet 3    metFORMIN (Glucophage) 500 mg tablet Take 1 tablet (500 mg) by mouth 2 times daily (morning and late afternoon).      metoprolol succinate XL (Toprol-XL) 25 mg 24 hr tablet TAKE 1 TABLET BY MOUTH DAILY 90 tablet 3    ondansetron ODT (Zofran-ODT) 4 mg disintegrating tablet Dissolve 1 tablet (4 mg) in the mouth every 8 hours if needed for nausea or vomiting.      pregabalin (Lyrica) 75 mg capsule TAKE 1 CAPSULE (75 MG) BY MOUTH 2 TIMES A DAY. TAKE PER DIRECTED 60 capsule 1    Rytary 23.75-95 mg capsule TAKE 3 CAPSULES BY MOUTH 3 TIMES A  capsule 3    traZODone (Desyrel) 100 mg tablet Take 3 tablets (300 mg) by mouth as needed at bedtime for sleep. 360 tablet 1    valACYclovir (Valtrex) 500 mg tablet Take 1 tablet (500 mg) by mouth if needed (blisters). 90 tablet 1      No current facility-administered medications for this visit.       PAST HISTORIES     PERTINENT PAST MEDICAL HISTORY: See HPI    PERTINENT PAST SURGICAL HISTORY for Sleep Medicine:  non-contributory    PERTINENT FAMILY HISTORY for Sleep Medicine:  sleep apnea- sisters x 2    PERTINENT SOCIAL HISTORY:  He  reports that he has never smoked. He has never used smokeless tobacco. He reports current alcohol use. He reports that he does not use drugs. He currently lives with spouse.    Active Problems, Allergy List, Medication List, and PMH/PSH/FH/Social Hx have been reviewed and reconciled in chart. No significant changes unless documented in the pertinent chart section. Updates made when necessary.     PHYSICAL EXAM     VITAL SIGNS: There were no vitals taken for this visit.    CURRENT WEIGHT:   There were no vitals filed for this visit.     PREVIOUS WEIGHTS:  Wt Readings from Last 3 Encounters:   01/06/25 72.6 kg (160 lb)   12/16/24 72.6 kg (160 lb)   12/06/24 75.5 kg (166 lb 7.2 oz)     Limited telehealth examination  PHYSICAL EXAMINATION  General appearance: awake alert in NAD  Affect: normal  HEENT: Nasal congestion absent  Lungs: no cough.  Neuro: normal speech      RESULTS/DATA     Iron (ug/dL)   Date Value   02/24/2024 95     % Saturation (%)   Date Value   02/24/2024 30     TIBC (ug/dL)   Date Value   02/24/2024 315     Ferritin (ng/mL)   Date Value   02/24/2024 258       Bicarbonate   Date Value Ref Range Status   11/25/2024 26 21 - 32 mmol/L Final       ASSESSMENT/PLAN     Mr. Newberry is a 64 y.o. male and He was referred to the [facility] for evaluation of ELAINE    Problem List, Orders, Assessment, Recommendations:    #ELAINE  -PSG 7/9/2018-showing moderate ELAINE with AHI 15.7, SpO2 emanuel 86%  -PAP titration 7/31/2018-recommend CPAP 7 cm H2O with ResMed N20 medium mask  - intolerant to CPAP in past when he tried intermittently for 3 weeks. Did better with NP than FFM  - HSAT  1/6/2025-showing mild ELAINE with AHI 3% 14.2, AHI 4% 6.1, SpO2 emanuel 79.5%  - Personally reviewed the sleep study's raw data such as interpretation report, data sheet, and hypnogram. Discussed sleep study results with patient today. A copy of recent testing was released in [facility].   - patient is interested in an oral appliance (OA) to treat his ELAINE. We will refer him to see Dr. Jesse Quispe  for an evaluation and will provide any needed documentation. Once he has an oral appliance and had undergone empiric adjustments, he should return for a sleep study with the OA in place.   - Diet, exercise, and weight loss were emphasized today in clinic, as were non-supine sleep, avoiding alcohol in the late evening, and driving or operating heavy machinery when sleepy.     #Insomnia  -sleep maintenance  - has tried the following meds: ambien, trazodone   - likely multifactorial. Etiology could include untreated sleep apnea, caffeine, circadian rhythm problems, poor sleepy hygiene.  -counseled on good sleep hygiene practices.  -keep a steady schedule, try to regulate your wake up time. Keep a sleep log.   -please minimize/eliminate caffeine, nicotine, and alcohol  -Avoid daytime naps   -decrease stimulating activity for at least two hours prior to the desired sleep onset time.   -get morning light exposure as much as possible especially when you first wake up.   -decrease light exposure as much as possible in the evening and around bedtime.   -Keep a regular eating schedule.   -Eat a healthy diet and exercise as tolerated.    - decrease trazodone from 400mg to 200mg (doing better on 200mg, recommend decreasing to 100mg)   - AVOID lunesta, doxepin>6mg  -May benefit from rotigotine patch versus pramipexole ; recommend discussing case with Dr. Alcazar     #PD  - on meds per neuro, defer management  - Denies dream enhancement behaviors     #HTN  BP Readings from Last 1 Encounters:   01/06/25 132/82     - doing well, asymptomatic,  denies any headache, blurry vision, chest pain, palpitation, dizziness, lightheadedness, or syncopal episodes  - discussed at length the impact of untreated ELAINE and BP control  - supportive management: low salt DASH diet (less than 2000 mg sodium intake daily), moderate intensity aerobic exercise at least 30 minutes 5 days per week, reduce stress, quit smoking, limit alcohol, lose weight, and monitor BP once daily  - continue current management and follow-up with PCP       All of patient's questions were answered. He verbalizes understanding and agreement with my assessment and plan.    Disposition    Follow up in 6 months or after MAD adjustments have been made

## 2025-03-11 DIAGNOSIS — B00.9 HERPES SIMPLEX: Primary | ICD-10-CM

## 2025-03-11 DIAGNOSIS — B00.9 HERPES SIMPLEX: ICD-10-CM

## 2025-03-11 RX ORDER — VALACYCLOVIR HYDROCHLORIDE 500 MG/1
500 TABLET, FILM COATED ORAL AS NEEDED
Qty: 40 TABLET | Refills: 2 | Status: SHIPPED | OUTPATIENT
Start: 2025-03-11 | End: 2026-03-11

## 2025-03-12 ENCOUNTER — OFFICE VISIT (OUTPATIENT)
Dept: HEMATOLOGY/ONCOLOGY | Facility: CLINIC | Age: 65
End: 2025-03-12
Payer: COMMERCIAL

## 2025-03-12 ENCOUNTER — LAB (OUTPATIENT)
Dept: LAB | Facility: CLINIC | Age: 65
End: 2025-03-12
Payer: COMMERCIAL

## 2025-03-12 VITALS
WEIGHT: 160.94 LBS | TEMPERATURE: 97.9 F | RESPIRATION RATE: 20 BRPM | HEART RATE: 54 BPM | BODY MASS INDEX: 23.77 KG/M2 | OXYGEN SATURATION: 99 % | SYSTOLIC BLOOD PRESSURE: 132 MMHG | DIASTOLIC BLOOD PRESSURE: 75 MMHG

## 2025-03-12 DIAGNOSIS — D72.819 LEUKOPENIA, UNSPECIFIED TYPE: ICD-10-CM

## 2025-03-12 DIAGNOSIS — D64.9 ANEMIA, UNSPECIFIED TYPE: ICD-10-CM

## 2025-03-12 DIAGNOSIS — E53.8 VITAMIN B12 DEFICIENCY: ICD-10-CM

## 2025-03-12 DIAGNOSIS — D64.9 ANEMIA, UNSPECIFIED TYPE: Primary | ICD-10-CM

## 2025-03-12 LAB
ALBUMIN SERPL BCP-MCNC: 4.4 G/DL (ref 3.4–5)
ALP SERPL-CCNC: 54 U/L (ref 33–136)
ALT SERPL W P-5'-P-CCNC: 4 U/L (ref 10–52)
ANION GAP SERPL CALC-SCNC: 10 MMOL/L (ref 10–20)
AST SERPL W P-5'-P-CCNC: 16 U/L (ref 9–39)
BASOPHILS # BLD AUTO: 0.04 X10*3/UL (ref 0–0.1)
BASOPHILS NFR BLD AUTO: 0.9 %
BILIRUB SERPL-MCNC: 0.9 MG/DL (ref 0–1.2)
BUN SERPL-MCNC: 23 MG/DL (ref 6–23)
CALCIUM SERPL-MCNC: 9 MG/DL (ref 8.6–10.3)
CHLORIDE SERPL-SCNC: 105 MMOL/L (ref 98–107)
CO2 SERPL-SCNC: 27 MMOL/L (ref 21–32)
CREAT SERPL-MCNC: 1.36 MG/DL (ref 0.5–1.3)
EGFRCR SERPLBLD CKD-EPI 2021: 58 ML/MIN/1.73M*2
EOSINOPHIL # BLD AUTO: 0.16 X10*3/UL (ref 0–0.7)
EOSINOPHIL NFR BLD AUTO: 3.8 %
ERYTHROCYTE [DISTWIDTH] IN BLOOD BY AUTOMATED COUNT: 12.8 % (ref 11.5–14.5)
GLUCOSE SERPL-MCNC: 86 MG/DL (ref 74–99)
HCT VFR BLD AUTO: 37.3 % (ref 41–52)
HGB BLD-MCNC: 12.6 G/DL (ref 13.5–17.5)
IMM GRANULOCYTES # BLD AUTO: 0.02 X10*3/UL (ref 0–0.7)
IMM GRANULOCYTES NFR BLD AUTO: 0.5 % (ref 0–0.9)
LYMPHOCYTES # BLD AUTO: 1 X10*3/UL (ref 1.2–4.8)
LYMPHOCYTES NFR BLD AUTO: 23.6 %
MCH RBC QN AUTO: 31.9 PG (ref 26–34)
MCHC RBC AUTO-ENTMCNC: 33.8 G/DL (ref 32–36)
MCV RBC AUTO: 94 FL (ref 80–100)
MONOCYTES # BLD AUTO: 0.42 X10*3/UL (ref 0.1–1)
MONOCYTES NFR BLD AUTO: 9.9 %
NEUTROPHILS # BLD AUTO: 2.6 X10*3/UL (ref 1.2–7.7)
NEUTROPHILS NFR BLD AUTO: 61.3 %
NRBC BLD-RTO: 0 /100 WBCS (ref 0–0)
PLATELET # BLD AUTO: 184 X10*3/UL (ref 150–450)
POTASSIUM SERPL-SCNC: 3.9 MMOL/L (ref 3.5–5.3)
PROT SERPL-MCNC: 6.3 G/DL (ref 6.4–8.2)
RBC # BLD AUTO: 3.95 X10*6/UL (ref 4.5–5.9)
SODIUM SERPL-SCNC: 138 MMOL/L (ref 136–145)
WBC # BLD AUTO: 4.2 X10*3/UL (ref 4.4–11.3)

## 2025-03-12 PROCEDURE — 80053 COMPREHEN METABOLIC PANEL: CPT

## 2025-03-12 PROCEDURE — 85025 COMPLETE CBC W/AUTO DIFF WBC: CPT

## 2025-03-12 PROCEDURE — 99213 OFFICE O/P EST LOW 20 MIN: CPT | Performed by: INTERNAL MEDICINE

## 2025-03-12 PROCEDURE — 36415 COLL VENOUS BLD VENIPUNCTURE: CPT

## 2025-03-12 PROCEDURE — 3075F SYST BP GE 130 - 139MM HG: CPT | Performed by: INTERNAL MEDICINE

## 2025-03-12 PROCEDURE — 4010F ACE/ARB THERAPY RXD/TAKEN: CPT | Performed by: INTERNAL MEDICINE

## 2025-03-12 PROCEDURE — 3078F DIAST BP <80 MM HG: CPT | Performed by: INTERNAL MEDICINE

## 2025-03-12 ASSESSMENT — PAIN SCALES - GENERAL: PAINLEVEL_OUTOF10: 0-NO PAIN

## 2025-03-12 NOTE — PROGRESS NOTES
Patient ID: : Delfino Newberry            Primary Care Provider: DL Dhaliwal    LOCATION:  [facility]    HEMATOLOGY ONCOLOGY PROBLEMS:  Anemia and leukopenia    CHIEF COMPLAINTS:  Patient is in the clinic today for follow-up of chronic mild leukopenia/anemia.    HISTORY:  Delfino Newberry is a 64 y.o. male with multiple medical problem who has also been noted to have normocytic anemia.  Blood work from February 2024 showed a hemoglobin of 12 with MCV of 93.  WBC was 3.1 with platelet of 265 K.  Extensive hematological workup done by Dr. Crespo revealed normal vitamin B12, folate, ferritin, iron panel, TSH etc.  Review of the previous blood work revealed normal hemoglobin ranging from 15-16 range over the last many years.  WBC were also normal ranging from 4-6 over the years.  Initial hematological evaluation was unremarkable in April 2024.  A follow-up bone marrow biopsy was essentially unremarkable in August 2024 other than finding of mild dysplastic changes involving the megakaryocytes.  Overall findings were nonspecific and diagnostic for MDS but the possibility of underlying myeloid processes like CCUS or subclinical MDS could not be completely ruled out.  Clinically he is asymptomatic and is currently being followed by close observation    INTERVAL HISTORY:  Patient denies any new complaints other than chronic issues with generalized weakness and chronic insomnia.  He is working full-time.  Of note he has baseline history of Parkinson disease and normal pressure hydrocephalus.  He has a  shunt placed and is being closely followed by neurosurgery team.    PAST MEDICAL HISTORY:  1.  Hypertension  2.  Diabetes mellitus type 2  3.  Parkinson's disease/hydrocephalus.  S/p  shunt  4.  Chronic insomnia  5.  Hyperlipidemia  6.  Coronary artery disease  7.  Peripheral neuropathy  8.  Vitamin D deficiency  9.  Obstructive sleep apnea  10.  History of cataract  surgery    SOCIAL HISTORY:   for 32 years and lives in [place].  Non-smoker.  Social alcohol intake.  He work in sales for [employer].  Born and raised in [place].    FAMILY HISTORY:  Father  at age 92 from natural causes.  Mother  at age 88 from natural causes.  10 siblings.  1 brother  from brain tumor at age 55.  He has 3 daughters all alive and well.  No other family history of bleeding, clotting or malignant disorders in the family history.    REVIEW OF SYSTEMS:   Pertinent finding as per the history above.  All other systems have been reviewed and generally negative and noncontributory.    VITAL SIGNS  BSA: 1.89 meters squared  /75   Pulse 54   Temp 36.6 °C (97.9 °F)   Resp 20   Wt 73 kg (160 lb 15 oz)   SpO2 99%   BMI 23.77 kg/m²     PHYSICAL EXAMINATION:  Detailed physical examination not done.    LAB DATA:  Repeat CBC from today shows a white cell count of 4.2 with hemoglobin of 12.6 and platelets of 184.  ANC is 2.6.  Extensive hematological evaluation was completely unremarkable on 2024.    Pathological data:  Bone marrow biopsy left iliac crest [id number] 2024  Histology;  -- MILDLY HYPERCELLULAR BONE MARROW (50%) WITH MATURING TRILINEAGE HEMATOPOIESIS AND 1% BLASTS.  -- MILD DYSPLASTIC CHANGES INVOLVING MEGAKARYOCYTES (10%); see note.    Note: The bone marrow biopsy reveals a mildly hypercellular marrow with trilineage hematopoiesis and mild single lineage (megakaryocyte) dysplasia. Although the morphological findings are not specific and diagnostic for myelodysplastic syndrome (MDS), the possibility of an underlying myeloid process CCUS or MDS) cannot be excluded particularly in the context of the patient's pancytopenia. Genetic/molecular studies, including karyotyping and myeloid NGS panel, have been ordered and will aid in further characterization and diagnosis.    Cytogenetics:   Karyotype 46,XY[20] MALE:   Interpretation : cytogenetic analysis of  20 metaphase cells from this 8/26/2024 Bone Marrow specimen revealed only normal metaphases.    Myeloid Malignancies Panel;  Interpretation: No variants are detected in the listed gene regions. This finding does not exclude the presence of genetic alterations present in gene regions not tested or occurring at a frequency below the limit of detection.     ASSESSMENT / PLAN:  1.  Anemia/leukopenia.  No clear etiology.  Please refer to the details as outlined above. In summary, patient with multiple medical problems who was also noted to have  leukopenia and anemia as detailed above.  Extensive hematological workup including metabolic profile, iron panel, ferritin, vitamin B12, folate, TSH, reticulocyte count, haptoglobin SAI, erythropoietin level, Philomena test, SPEP, Philomena test, erythropoietin level etc were unremarkable.  A follow-up bone marrow biopsy was essentially unremarkable in Aug 2024 other than finding of mild dysplastic changes involving the megakaryocytes.  Overall findings were nonspecific and nondiagnostic for MDS but the possibility of underlying myeloid processes like CCUS or subclinical MDS could not be completely ruled out.  Clinically he is asymptomatic and is currently being followed by close observation.    Clinically he is asymptomatic and blood counts are stable.  For now we will continue to follow him with close observation as before.  As stated above, extensive hematological workup including bone marrow biopsy were essentially unremarkable.  So far there is no conclusive evidence of any baseline clonal disorder.  He is again advised to take maximum infection prevention precautions.      2.  Follow-up.  He will return to clinic in 6 months.    This note has been transcribed using Dragon voice recognition system and there is a possibility of unintentional typing misprints.

## 2025-03-18 ENCOUNTER — APPOINTMENT (OUTPATIENT)
Dept: NEUROLOGY | Facility: CLINIC | Age: 65
End: 2025-03-18
Payer: COMMERCIAL

## 2025-03-18 VITALS
DIASTOLIC BLOOD PRESSURE: 76 MMHG | HEART RATE: 57 BPM | HEIGHT: 69 IN | SYSTOLIC BLOOD PRESSURE: 138 MMHG | WEIGHT: 164 LBS | TEMPERATURE: 97.7 F | BODY MASS INDEX: 24.29 KG/M2

## 2025-03-18 DIAGNOSIS — G20.A2 PARKINSON'S DISEASE WITHOUT DYSKINESIA, WITH FLUCTUATING MANIFESTATIONS: Primary | ICD-10-CM

## 2025-03-18 PROCEDURE — 3078F DIAST BP <80 MM HG: CPT | Performed by: PSYCHIATRY & NEUROLOGY

## 2025-03-18 PROCEDURE — G2211 COMPLEX E/M VISIT ADD ON: HCPCS | Performed by: PSYCHIATRY & NEUROLOGY

## 2025-03-18 PROCEDURE — 3008F BODY MASS INDEX DOCD: CPT | Performed by: PSYCHIATRY & NEUROLOGY

## 2025-03-18 PROCEDURE — 3075F SYST BP GE 130 - 139MM HG: CPT | Performed by: PSYCHIATRY & NEUROLOGY

## 2025-03-18 PROCEDURE — 1036F TOBACCO NON-USER: CPT | Performed by: PSYCHIATRY & NEUROLOGY

## 2025-03-18 PROCEDURE — 99214 OFFICE O/P EST MOD 30 MIN: CPT | Performed by: PSYCHIATRY & NEUROLOGY

## 2025-03-18 PROCEDURE — 4010F ACE/ARB THERAPY RXD/TAKEN: CPT | Performed by: PSYCHIATRY & NEUROLOGY

## 2025-03-18 ASSESSMENT — PATIENT HEALTH QUESTIONNAIRE - PHQ9
1. LITTLE INTEREST OR PLEASURE IN DOING THINGS: SEVERAL DAYS
2. FEELING DOWN, DEPRESSED OR HOPELESS: SEVERAL DAYS
10. IF YOU CHECKED OFF ANY PROBLEMS, HOW DIFFICULT HAVE THESE PROBLEMS MADE IT FOR YOU TO DO YOUR WORK, TAKE CARE OF THINGS AT HOME, OR GET ALONG WITH OTHER PEOPLE: NOT DIFFICULT AT ALL
SUM OF ALL RESPONSES TO PHQ9 QUESTIONS 1 AND 2: 2

## 2025-03-18 ASSESSMENT — ENCOUNTER SYMPTOMS
DEPRESSION: 0
LOSS OF SENSATION IN FEET: 0
OCCASIONAL FEELINGS OF UNSTEADINESS: 1

## 2025-03-18 ASSESSMENT — PAIN SCALES - GENERAL: PAINLEVEL_OUTOF10: 0-NO PAIN

## 2025-03-18 NOTE — PROGRESS NOTES
Subjective     Delfino Newberry is a 63 y.o. year old male here for PD and NPH follow up.     HPI  He is followed by a sleep specialist. He has insomnia.  He is doing well.   Using the phone to type and text can be annoying.   Ropinirole was stopped due to a rash - turned out not to be due to Ropinirole.    Current PD meds:  Rytary 95mg 3 caps 6am/ 3 caps at noon/ 3 caps at 6pm.   Gocovri 37mg 2 caps at night - helped tongue dyskinesia.     He feels that Rytary helps a lot with his tongue dyskinesia and movement and speech.       Due to oral / tongue dyskinesia he stopped Sinemet (even on 1/2 tab TID) and started Ropinirole but the oral movements did not go away.    Neuropathy- he takes lyrica as well as b12 injections.    Gocovri 37mg 2 caps at night - helped dyskinesia.      past- he tried Amantadine - did not help. he tried 100mg once daily caused stomach issues.      He was on Sinemet 1 tab TID - initially had great response.      PMH complicated with HTN, DM2, CAD (on ASA daily LD 6/1), polyneuropathy, B12 deficiency, NPH (s/p RF VPS 6/2020w/ improvement, presented to [facility] obtunded 12/2020, s/p EVD placement, prolonged   hospital course, s/p RF VPS replacement- managed with Dr. De Jesus- seen by him few weeks ago and increased flow to the  shunt.     wife thinks the speech changes occurred after December and his shunt complication.      has tremors for since early 2020. occur at rest. L more than R hand.   admitted to [facility] for persistent confusion, drooling, and dysphagia.     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   seen by Ophthalmology-Dr. Heraclio Delatorre and ophtho fellow last week.          neuropsych testing April 2019-showed a mixed picture. He did not have dramatic improvement after large volume LP. on ddx was LBD vs. FTLD.   wife states he has a flat affect but memory  is pretty stable. walking is okay. no falls.   most noticeable recent changes are the speech and swallowing changes.      He first saw Dr. Alcazar in July 2018 and Feb 2019 for ELAINE and then neuropathy, had brain imaging then went to [facility] for evaluation for NPH.     MRI brain june 1 2021 showed repositioned R  shunt, showed aqueductal stenosis.       December 2019 he became obtunded /lethargic and was admitted to [facility] where he was found to have a shunt failure. He had a revision surgery and was discharged with gradual improvements and ability to ambulate independently but states that cognitively he never returned to baseline.   Since, he has had a slowly progressive decline in his ability to swallow. His wife states that he coughs after he eats and drinks, his voice is softer, having a lot of drooling      FH: mother had dementia in her 80s.   Review of Systems   Constitutional:  Negative for fever.   HENT:  Negative for ear pain.    Eyes:  Negative for pain.   Neurological:  Negative for syncope and headaches.   Psychiatric/Behavioral:  Negative for hallucinations and self-injury.    All other systems reviewed and are negative.    Patient Active Problem List   Diagnosis    Benign hypertension    Cerebral ventriculomegaly    Hydrocephalus (CMS/HCC)    Cervical myelopathy (CMS/HCC)    Chest tightness    Coronary artery disease    Dysphagia    Dysphonia    Nuclear sclerosis    Hyperlipidemia    Palsy of conjugate gaze    Paresthesia of both feet    Parkinsonism    Presence of stent in coronary artery    Sialorrhea    Excessive daytime sleepiness    Fever    Headache    ELAINE (obstructive sleep apnea)    Runny nose    Sinus bradycardia    Snoring    Unsteady gait    Vitamin B12 deficiency    Oral dyskinesia     Past Medical History:   Diagnosis Date    (Idiopathic) normal pressure hydrocephalus (CMS/HCC) 07/17/2021    NPH (normal pressure hydrocephalus)    Atherosclerotic heart disease of native coronary artery without  angina pectoris 06/20/2022    Coronary artery disease    Disease of spinal cord, unspecified (CMS/[facility]) 07/15/2021    Cervical myelopathy    Obstructive sleep apnea (adult) (pediatric) 07/15/2021    Obstructive sleep apnea, adult    Obstructive sleep apnea (adult) (pediatric) 02/07/2019    ELAINE (obstructive sleep apnea)    Other hyperlipidemia 07/15/2021    Other hyperlipidemia    Parkinson's disease (CMS/[facility]) 11/29/2022    Parkinsonism    Personal history of other diseases of the circulatory system 07/15/2021    History of coronary artery disease    Personal history of other diseases of the circulatory system 07/15/2021    History of hypertension    Personal history of other diseases of the circulatory system     History of abnormal electrocardiography    Personal history of other diseases of the musculoskeletal system and connective tissue     History of backache    Personal history of other endocrine, nutritional and metabolic disease 07/15/2021    History of type 2 diabetes mellitus    Personal history of other specified conditions 06/01/2018    History of snoring    Personal history of other specified conditions 07/15/2021    History of chest pain     Past Surgical History:   Procedure Laterality Date    CARDIAC CATHETERIZATION  07/17/2021    Cardiac Cath Procedure Outcome:    OTHER SURGICAL HISTORY  07/17/2021    Ventriculoperitoneal shunt creation     Social History     Tobacco Use    Smoking status: Not on file    Smokeless tobacco: Not on file   Substance Use Topics    Alcohol use: Not on file     family history includes Coronary artery disease in his mother; Stroke in his father.    Current Outpatient Medications:     amantadine HCL (Gocovri) 137 mg capsule,extended release 24hr, Take by mouth. Take per directed, Disp: , Rfl:     atorvastatin (Lipitor) 20 mg tablet, Take 1 tablet (20 mg) by mouth once daily., Disp: , Rfl:     carbidopa-levodopa (Sinemet)  mg tablet, Take by mouth. Take per directed,  Disp: , Rfl:     cyanocobalamin (Vitamin B-12) 1,000 mcg/mL injection, Take per directed, Disp: , Rfl:     folic acid (Folvite) 1 mg tablet, Take 1 tablet (1 mg) by mouth once daily., Disp: , Rfl:     losartan (Cozaar) 50 mg tablet, Take 1 tablet (50 mg) by mouth once daily., Disp: , Rfl:     metFORMIN (Glucophage) 500 mg tablet, Take 1 tablet (500 mg) by mouth 2 times a day with meals., Disp: , Rfl:     metoprolol succinate XL (Toprol-XL) 50 mg 24 hr tablet, Take 1 tablet (50 mg) by mouth once daily., Disp: , Rfl:     mirabegron (Myrbetriq) 50 mg tablet extended release 24 hr 24 hr tablet, Take 1 tablet (50 mg) by mouth once daily., Disp: , Rfl:     ondansetron ODT (Zofran-ODT) 4 mg disintegrating tablet, Take 1 tablet (4 mg) by mouth every 8 hours if needed for nausea or vomiting., Disp: , Rfl:     pregabalin (Lyrica) 75 mg capsule, Take by mouth twice a day. Take per directed, Disp: , Rfl:     triamcinolone (Kenalog) 0.1 % cream, Apply topically 2 times a day. Take per directed, Disp: , Rfl:     triamterene-hydrochlorothiazid (Maxzide-25) 37.5-25 mg tablet, Take by mouth. Take per directed, Disp: , Rfl:   No Known Allergies  There were no vitals taken for this visit.  Neurological Exam/Physical Exam:      Constitutional: General appearance: no acute distress   Auscultation of Heart: Regular rate and rhythm, no murmurs, normal S1 and S2.   Carotid Arteries: Intact without any bruits   Peripheral Vascular Exam: Pulses +2 and equal in all extremities. Mild left leg swelling.   Mental status: The patient was in no distress, alert, interactive and cooperative. Affect is appropriate.   Orientation: oriented to person, oriented to place and oriented to time.   Language: normal comprehension and no difficulty naming common objects.   Eyes: The ophthalmoscopic examination was normal. The fundi are visualized with normal disc margins and without hemorrhages   Cranial nerve II: Visual fields full to confrontation.   Cranial  "nerves III, IV, and VI: Abnormal . unable to look upgaze.   Cranial Nerve V: Facial sensation intact bilaterally.   Cranial nerve VII: Normal and symmetric facial strength.   Cranial nerve VIII: Hearing is intact bilaterally to finger rub / whisper.   Cranial nerves IX and X: Palate elevates symmetrically.   Cranial nerve XI: Shoulder shrug and neck rotation strength are intact.   Cranial nerve XII: Tongue midline with normal strength.   Motor:  very mildongue / mouth dyskinesia. tremors of R hand at rest at times mild tremulousness in hands. mild bradykinesia. mild rigidity in L >R side.   Deep Tendon Reflexes: left biceps 2+ , right biceps 2+, left triceps 2+, right triceps 2+, left brachioradialis 3+, right brachioradialis 3+, left patella 3+, right patella 3+, left ankle jerk 3+, right ankle jerk 3+   Plantar Reflex: Toes downgoing to plantar stimulation on the left. Toes downgoing to plantar stimulation on the right.   Sensory Exam: Sensory Exam was abnormal. dec vibration in feet b/l.   Coordination: There is no limb dystaxia and rapid alternating movements are intact.   Gait: Abnormal . mildly wide based gait.        Labs:  No components found for: \"THRYOIDSTIMU\"  CBC:   Lab Results   Component Value Date    WBC 5.3 06/01/2021    HGB 16.1 06/01/2021    HCT 44.5 06/01/2021     06/01/2021     BMP:   Lab Results   Component Value Date     (L) 06/01/2021    K 3.3 (L) 06/01/2021    CL 95 (L) 06/01/2021    CO2 26 06/01/2021    BUN 16 06/01/2021    CREATININE 1.19 06/01/2021    CALCIUM 10.1 06/01/2021     LFT:   Lab Results   Component Value Date    ALKPHOS 69 12/03/2020    BILITOT 1.0 12/03/2020    PROT 7.1 12/03/2020    ALBUMIN 4.6 12/03/2020    ALT 28 12/03/2020    AST 21 12/03/2020         Assessment/Plan   Problem List Items Addressed This Visit             ICD-10-CM    Hydrocephalus (CMS/[facility]) G91.9    Parkinsonism - Primary G20.C    with NPH, parkinsonism, speech/bulbar symptoms probable upgaze " palsy from parkinsonism ,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   he failed amantadine - could not tolerate.   NPH likely contributing.  c/w rytary 95mg 3 caps TID and gocovri.       This is a chronic neurologic condition that requires ongoing care and monitoring. This is a complex, serious condition that needs long term care going forward. Between myself and the patient we will be changing direction of care depending on responses to treatment.   Today we discussed medication options, non medication options for management and various other symptoms that are in relation to this disease.  I will continue to be involved in the care of this patient.

## 2025-03-18 NOTE — PATIENT INSTRUCTIONS
"It was a pleasure seeing you today.     Please make a follow up appointment in 6 months.  You may also schedule a phone or virtual visit sooner on a Friday morning with me as needed before the next clinic appointment.     For any urgent issues or needing to speak to a medical assistant please call [phone], option 6 during our office hours Monday-Friday 8am-4pm, and leave a voicemail with your concern.  My office will try to reach back you as soon as possible within 24 (business) hours.  If you have an emergency please call 911 or visit a local urgent care or nearest emergency room.      Please understand that [employer] is a useful communication tool for simple \"normal\" results or a refill request but I would not recommend using this tool for emergent or urgent issues or for conversations with me.  I am happy to ask my staff to rearrange a follow up visit or a virtual visit sooner than requested if appropriate for your care.    "

## 2025-03-22 DIAGNOSIS — M47.896 OTHER SPONDYLOSIS, LUMBAR REGION: ICD-10-CM

## 2025-03-24 RX ORDER — CELECOXIB 200 MG/1
200 CAPSULE ORAL 2 TIMES DAILY
Qty: 60 CAPSULE | Refills: 11 | Status: SHIPPED | OUTPATIENT
Start: 2025-03-24

## 2025-04-22 NOTE — PROGRESS NOTES
"   Patient: Delfino Newberry  : 1960 AGE: 64 y.o. SEX:male   MRN: [id number]   Provider: DL Martinez     Location [facility]   Service Date: 2025     PCP: DL Dhaliwal   Referred by: No ref. provider found          [facility]  Follow Up Visit Note    Virtual or Telephone Consent  An interactive audio and video telecommunication system which permits real time communications between the patient (at the originating site) and provider (at the distant site) was utilized to provide this telehealth service.   Verbal consent was requested and obtained from Delfino Newberry on this date, 25 for a telehealth visit and the patient's location was confirmed at the time of the visit.     HISTORY OF PRESENT ILLNESS     Delfino Newberry is a 64 y.o. male with a h/o  ELAINE, HTN, DM2, CAD (on ASA daily LD ), polyneuropathy, B12 deficiency, NPH (s/p RF VPS 2020),  s/p RF VPS replacement- managed with Dr. De Jesus, Parkinson's disease, neuropathy   who presents to [facility].    25: Was unable to get ahold of Dr. Quispe's office for OAT and calling 6x to get initial consultation appointment set up. Still interested in OAT. Still only sleeping 4-5 hours/nights. Has weaned trazodone to 200mg. Previously on Ropinirole which was stopped due to a rash. ---> Start Remeron 7.5mg      25: Here to review HSAT results. Sleep remains \"sporadic at best\". Has decreased trazodone to 200mg as recommended at previous office visit. Reports this has helped reduce his daytime sleepiness with de escalation of dose and has not changed his sleep. TST 4 hrs/night. ---> interested in OAT     24: NPV, referred by neurology with concerns of insomnia/ELAINE management.  Patient was diagnosed with ELAINE in 2018 by PSG and was using CPAP intermittently for 3 weeks then stopped due to intolerant to PAP, toss and turning frequently . " Now struggling with sleep maintenance insomnia for the past 6 months. He was started on trazodone 50mg by his PCP, who increased each month and is now on 400mg nightly. Tried OTC sleep aides, ambien (kind of helpful), melatonin all ineffective. Reports TST 4 hours/night.     takes trazodone at 9PMBed at 11PM, falls asleep around midnight. Often wakes without alarm due to unknown reason around 3:30-4AM, unable to fall back asleep. Sometimes watches TV in bed. Wakes un refreshed but functions well during the day. Still snores but significantly reduced since 30lb weight loss. ----> HSAT ordered, discussed sleep hygiene, reduce trazodone to 200mg nightly for now    SLEEP STUDY HISTORY (personally reviewed raw data such as interpretation report, data sheet, hypnogram, and titration table if available and applicable)  -PSG 7/9/2018-showing moderate ELAINE with AHI 15.7, SpO2 emanuel 86% (BMI 30)   -PAP titration 7/31/2018-recommend CPAP 7 cm H2O with ResMed N20 medium mask  - HSAT 1/6/2025-showing mild ELAINE with AHI 3% 14.2, AHI 4% 6.1, SpO2 emanuel 79.5%    SLEEP-WAKE SCHEDULE    Sleep Patterns: He does not have a usual bed partner. In terms of the patient's sleep/wake cycle, he generally gets into bed at approximately 11 PM.  his latency to sleep onset after lights out is . During the night, the patient generally awakens 2-3 times nightly. These awakenings are usually prolonged in duration. Final wake time on weekday mornings is around 3:30-5 AM.    Compared to weekdays, the patient's sleep schedule is  similar on the weekends.    Breathing during sleep: snoring  Behaviors at night: No   Sleep paralysis: No   Hypnogogic or hypnopompic hallucinations: No   Cataplexy: No     Leg symptoms and timing:  - Sensations: Patient does not have unusual sensations in their extremities that cause an urge to move them   - Movement: Patient has not been told that their legs kick or jerk during sleep    Daytime Symptoms:  On awakening  patient reports: wake unrefreshed, feels sleepy, morning dry mouth, and hard to get out of bed  Patient report some daytime symptoms including: DAYTIME SYMPTOMS: reports sleep inertia    Sleep environment:  Preferred sleep position: side  Room is dark: Yes  Room is quiet: Yes  Room is cool: Yes  Bed comfort: good    SLEEP HABITS  Caffeine consumption: Yes, 1-2 cups/day  Alcohol consumption: Yes, rarely (occasional)  Smoking: No  Marijuana: No  Sleep aids: trazodone 400 mg     WEIGHT: lost 30lbs in 6 months     ESS:      REVIEW OF SYSTEMS     All other systems have been reviewed and are negative.    ALLERGIES     Allergies   Allergen Reactions    Naproxen Sodium Rash    Lisinopril Cough    Losartan Cough    Nsaids (Non-Steroidal Anti-Inflammatory Drug) Unknown    Plavix [Clopidogrel] Rash       MEDICATIONS     Current Outpatient Medications   Medication Sig Dispense Refill    amantadine HCL (Gocovri) 137 mg capsule,extended release 24hr Take 2 caps at bedtime 60 capsule 11    aspirin 81 mg EC tablet Take 1 tablet (81 mg) by mouth once daily.      atorvastatin (Lipitor) 20 mg tablet TAKE ONE TABLET BY MOUTH EVERY DAY 90 tablet 3    celecoxib (CeleBREX) 200 mg capsule TAKE 1 CAPSULE BY MOUTH TWICE A DAY 60 capsule 11    cyanocobalamin (Vitamin B-12) 1,000 mcg/mL injection Take per directed      folic acid (Folvite) 1 mg tablet TAKE 1 TABLET BY MOUTH ONCE DAILY. 90 tablet 3    losartan (Cozaar) 50 mg tablet TAKE 1 TABLET BY MOUTH DAILY 90 tablet 3    metFORMIN (Glucophage) 500 mg tablet Take 1 tablet (500 mg) by mouth 2 times daily (morning and late afternoon).      metoprolol succinate XL (Toprol-XL) 25 mg 24 hr tablet TAKE 1 TABLET BY MOUTH DAILY 90 tablet 3    ondansetron ODT (Zofran-ODT) 4 mg disintegrating tablet Dissolve 1 tablet (4 mg) in the mouth every 8 hours if needed for nausea or vomiting.      pregabalin (Lyrica) 75 mg capsule TAKE 1 CAPSULE (75 MG) BY MOUTH 2 TIMES A DAY. TAKE PER DIRECTED 60 capsule 1     "Rytary 23.75-95 mg capsule TAKE 3 CAPSULES BY MOUTH 3 TIMES A  capsule 3    traZODone (Desyrel) 100 mg tablet Take 3 tablets (300 mg) by mouth as needed at bedtime for sleep. 360 tablet 1    valACYclovir (Valtrex) 500 mg tablet TAKE 1 TABLET (500 MG) BY MOUTH IF NEEDED (BLISTERS). 40 tablet 2    valACYclovir (Valtrex) 500 mg tablet Take 1 tablet (500 mg) by mouth if needed (blisters). 40 tablet 2    mirtazapine (Remeron) 7.5 mg tablet Take 1 tablet (7.5 mg) by mouth once daily at bedtime. 90 tablet 0     No current facility-administered medications for this visit.       PAST HISTORIES     PERTINENT PAST MEDICAL HISTORY: See HPI    PERTINENT PAST SURGICAL HISTORY for Sleep Medicine:  non-contributory    PERTINENT FAMILY HISTORY for Sleep Medicine:  sleep apnea- sisters x 2    PERTINENT SOCIAL HISTORY:  He  reports that he has never smoked. He has never used smokeless tobacco. He reports current alcohol use. He reports that he does not use drugs. He currently lives with spouse.    Active Problems, Allergy List, Medication List, and PMH/PSH/FH/Social Hx have been reviewed and reconciled in chart. No significant changes unless documented in the pertinent chart section. Updates made when necessary.     PHYSICAL EXAM     VITAL SIGNS: /76   Pulse 53   Resp 18   Ht 1.753 m (5' 9\")   Wt 70.5 kg (155 lb 6.4 oz)   SpO2 98%   BMI 22.95 kg/m²     CURRENT WEIGHT:   Vitals:    04/29/25 1532   Weight: 70.5 kg (155 lb 6.4 oz)     PREVIOUS WEIGHTS:  Wt Readings from Last 3 Encounters:   04/29/25 70.5 kg (155 lb 6.4 oz)   03/18/25 74.4 kg (164 lb)   03/12/25 73 kg (160 lb 15 oz)     Constitutional: Alert and oriented, cooperative, no obvious distress   HEENT: Non icteric or anemic, EOM WNL bilaterally   Neck: Supple, no JVD, no goiter, no adenopathy, no rigidity     RESULTS/DATA     Iron (ug/dL)   Date Value   02/24/2024 95     % Saturation (%)   Date Value   02/24/2024 30     TIBC (ug/dL)   Date Value   02/24/2024 " 315     Ferritin (ng/mL)   Date Value   02/24/2024 258       Bicarbonate   Date Value Ref Range Status   03/12/2025 27 21 - 32 mmol/L Final       ASSESSMENT/PLAN     Mr. Newberry is a 64 y.o. male and He was referred to the [facility] for evaluation of ELAINE    Problem List, Orders, Assessment, Recommendations:    #ELAINE  -PSG 7/9/2018-showing moderate ELAINE with AHI 15.7, SpO2 emanuel 86%  -PAP titration 7/31/2018-recommend CPAP 7 cm H2O with ResMed N20 medium mask  - intolerant to CPAP in past when he tried intermittently for 3 weeks. Did better with NP than FFM  - HSAT 1/6/2025-showing mild ELAINE with AHI 3% 14.2, AHI 4% 6.1, SpO2 emanuel 79.5%  - patient is interested in an oral appliance (OA) to treat his ELAINE. We will refer him to see [facility] for an evaluation , we have fax patient records. (Previously unsuccessful scheduling with Dr. Quispe). Once he has an oral appliance and had undergone empiric adjustments, he should return for a sleep study with the OA in place.   - Diet, exercise, and weight loss were emphasized today in clinic, as were non-supine sleep, avoiding alcohol in the late evening, and driving or operating heavy machinery when sleepy.     #Insomnia  - sleep maintenance  - Start Remeron 7.5mg nightly   - has tried the following meds: ambien, trazodone   - likely multifactorial. Etiology could include untreated sleep apnea, caffeine, circadian rhythm problems, poor sleepy hygiene.  -counseled on good sleep hygiene practices.  -keep a steady schedule, try to regulate your wake up time. Keep a sleep log.   -please minimize/eliminate caffeine, nicotine, and alcohol  -Avoid daytime naps   -decrease stimulating activity for at least two hours prior to the desired sleep onset time.   -get morning light exposure as much as possible especially when you first wake up.   -decrease light exposure as much as possible in the evening and around bedtime.   -Keep a regular  eating schedule.   -Eat a healthy diet and exercise as tolerated.    - decrease trazodone from 400mg to 200mg (doing better on 200mg, recommend decreasing to 100mg)   - AVOID lunesta, doxepin>6mg  -If necessary may benefit from rotigotine patch versus pramipexole ; recommend discussing case with Dr. Alcazar     #PD  - on meds per neuro, defer management  - Denies dream enhancement behaviors     #HTN  BP Readings from Last 1 Encounters:   04/29/25 126/76     - doing well, asymptomatic, denies any headache, blurry vision, chest pain, palpitation, dizziness, lightheadedness, or syncopal episodes  - discussed at length the impact of untreated ELAINE and BP control  - supportive management: low salt DASH diet (less than 2000 mg sodium intake daily), moderate intensity aerobic exercise at least 30 minutes 5 days per week, reduce stress, quit smoking, limit alcohol, lose weight, and monitor BP once daily  - continue current management and follow-up with PCP       All of patient's questions were answered. He verbalizes understanding and agreement with my assessment and plan.    Disposition    Follow up in 3 months

## 2025-04-29 ENCOUNTER — APPOINTMENT (OUTPATIENT)
Facility: CLINIC | Age: 65
End: 2025-04-29
Payer: COMMERCIAL

## 2025-04-29 VITALS
HEART RATE: 53 BPM | SYSTOLIC BLOOD PRESSURE: 126 MMHG | DIASTOLIC BLOOD PRESSURE: 76 MMHG | BODY MASS INDEX: 23.02 KG/M2 | OXYGEN SATURATION: 98 % | WEIGHT: 155.4 LBS | RESPIRATION RATE: 18 BRPM | HEIGHT: 69 IN

## 2025-04-29 DIAGNOSIS — G47.33 OSA (OBSTRUCTIVE SLEEP APNEA): ICD-10-CM

## 2025-04-29 DIAGNOSIS — I10 BENIGN HYPERTENSION: ICD-10-CM

## 2025-04-29 DIAGNOSIS — Z72.821 POOR SLEEP HYGIENE: ICD-10-CM

## 2025-04-29 DIAGNOSIS — G20.C PARKINSONISM, UNSPECIFIED PARKINSONISM TYPE (MULTI): ICD-10-CM

## 2025-04-29 DIAGNOSIS — G47.09 OTHER INSOMNIA: Primary | ICD-10-CM

## 2025-04-29 PROCEDURE — 3074F SYST BP LT 130 MM HG: CPT | Performed by: NURSE PRACTITIONER

## 2025-04-29 PROCEDURE — 4010F ACE/ARB THERAPY RXD/TAKEN: CPT | Performed by: NURSE PRACTITIONER

## 2025-04-29 PROCEDURE — 99214 OFFICE O/P EST MOD 30 MIN: CPT | Performed by: NURSE PRACTITIONER

## 2025-04-29 PROCEDURE — 1036F TOBACCO NON-USER: CPT | Performed by: NURSE PRACTITIONER

## 2025-04-29 PROCEDURE — 3008F BODY MASS INDEX DOCD: CPT | Performed by: NURSE PRACTITIONER

## 2025-04-29 PROCEDURE — G8433 SCR FOR DEP NOT CPT DOC RSN: HCPCS | Performed by: NURSE PRACTITIONER

## 2025-04-29 PROCEDURE — 3078F DIAST BP <80 MM HG: CPT | Performed by: NURSE PRACTITIONER

## 2025-04-29 RX ORDER — MIRTAZAPINE 7.5 MG/1
7.5 TABLET, FILM COATED ORAL NIGHTLY
Qty: 90 TABLET | Refills: 0 | Status: SHIPPED | OUTPATIENT
Start: 2025-04-29 | End: 2025-07-28

## 2025-04-29 ASSESSMENT — SLEEP AND FATIGUE QUESTIONNAIRES
SITING INACTIVE IN A PUBLIC PLACE LIKE A CLASS ROOM OR A MOVIE THEATER: SLIGHT CHANCE OF DOZING
ESS-CHAD TOTAL SCORE: 6
HOW LIKELY ARE YOU TO NOD OFF OR FALL ASLEEP WHILE SITTING QUIETLY AFTER LUNCH WITHOUT ALCOHOL: WOULD NEVER DOZE
HOW LIKELY ARE YOU TO NOD OFF OR FALL ASLEEP WHILE SITTING AND TALKING TO SOMEONE: WOULD NEVER DOZE
HOW LIKELY ARE YOU TO NOD OFF OR FALL ASLEEP WHILE SITTING AND READING: MODERATE CHANCE OF DOZING
HOW LIKELY ARE YOU TO NOD OFF OR FALL ASLEEP WHILE LYING DOWN TO REST IN THE AFTERNOON WHEN CIRCUMSTANCES PERMIT: SLIGHT CHANCE OF DOZING
HOW LIKELY ARE YOU TO NOD OFF OR FALL ASLEEP WHEN YOU ARE A PASSENGER IN A CAR FOR AN HOUR WITHOUT A BREAK: SLIGHT CHANCE OF DOZING
HOW LIKELY ARE YOU TO NOD OFF OR FALL ASLEEP WHILE WATCHING TV: SLIGHT CHANCE OF DOZING
HOW LIKELY ARE YOU TO NOD OFF OR FALL ASLEEP IN A CAR, WHILE STOPPED FOR A FEW MINUTES IN TRAFFIC: WOULD NEVER DOZE

## 2025-04-29 ASSESSMENT — ENCOUNTER SYMPTOMS
LOSS OF SENSATION IN FEET: 1
OCCASIONAL FEELINGS OF UNSTEADINESS: 0
DEPRESSION: 0

## 2025-04-29 ASSESSMENT — COLUMBIA-SUICIDE SEVERITY RATING SCALE - C-SSRS
2. HAVE YOU ACTUALLY HAD ANY THOUGHTS OF KILLING YOURSELF?: NO
1. IN THE PAST MONTH, HAVE YOU WISHED YOU WERE DEAD OR WISHED YOU COULD GO TO SLEEP AND NOT WAKE UP?: NO
6. HAVE YOU EVER DONE ANYTHING, STARTED TO DO ANYTHING, OR PREPARED TO DO ANYTHING TO END YOUR LIFE?: NO

## 2025-04-29 NOTE — PATIENT INSTRUCTIONS
Oral Appliance Therapy    Oral appliance therapy is a dental device that can be fabricated by a dental sleep medicine specialist. We discussed that we will refer you for an evaluation.    Please schedule an appointment with dentistry to evaluate you for an oral appliance to treat your sleep apnea. Consider the following specialists in the area:     Dr. Virginia Hernandez  [address] [place] [address]  Phone: [phone]  Website: [email or web address]    Dr. Kevin Angeles DDS  [address] [id number]  Phone: [phone]  Fax: [phone]  Website: [email or web address]     Dr. Zaid Roberts, [facility]  Dental Sleep Apnea Therapy [place]  Zaid Roberts DDS  [address] [address] [address]  Phone: [phone]  Website: [email or web address]    Dr. Jesse Quispe  /[facility]: [phone]    Dr. Norbert Blas  [facility]   [phone]    Dr. Alvin Gutierrez  [place], [place]: [phone]    Dr. Frankie Pierre  [place], [place]: [phone]    Dr. Guillermo Cervantes, [place]: [phone]    Dr. Rehana Moya, [phone]    Dr. Mark Anthony Ramirez, [place]: [phone]    Dr. Heraclio Marie, [place]: [phone]    Dr. Edward Hurst, [place]: [phone]    Dr. Kris Espino, [place]: [phone]    [facility]:  Dr. Esteban Stewart    Oral appliance therapy is typically covered by your medical insurance as sleep apnea is a medical diagnoses and not a dental diagnosis. You can confirm coverage by calling your medical insurance and providing them with the following medical codes:  Diagnosis code: Obstructive Sleep Apnea G47.33  Procedure code: S19547    After your appliance is made and adjusted, we like to make sure it is treating your sleep apnea effectively. Please return to our clinic at that time for follow up.

## 2025-05-19 DIAGNOSIS — G20.A1 PARKINSON'S DISEASE: ICD-10-CM

## 2025-05-19 RX ORDER — LEVODOPA AND CARBIDOPA 95; 23.75 MG/1; MG/1
3 CAPSULE, EXTENDED RELEASE ORAL 3 TIMES DAILY
Qty: 810 CAPSULE | Refills: 3 | Status: SHIPPED | OUTPATIENT
Start: 2025-05-19

## 2025-05-28 PROBLEM — N18.31 CHRONIC KIDNEY DISEASE, STAGE 3A (MULTI): Status: ACTIVE | Noted: 2025-04-28

## 2025-06-06 DIAGNOSIS — I10 BENIGN HYPERTENSION: Primary | ICD-10-CM

## 2025-06-06 DIAGNOSIS — I25.10 CORONARY ARTERY DISEASE INVOLVING NATIVE CORONARY ARTERY OF NATIVE HEART WITHOUT ANGINA PECTORIS: ICD-10-CM

## 2025-06-06 RX ORDER — LOSARTAN POTASSIUM 50 MG/1
50 TABLET ORAL DAILY
Qty: 90 TABLET | Refills: 3 | Status: SHIPPED | OUTPATIENT
Start: 2025-06-06

## 2025-06-06 RX ORDER — METOPROLOL SUCCINATE 25 MG/1
25 TABLET, EXTENDED RELEASE ORAL DAILY
Qty: 90 TABLET | Refills: 3 | Status: SHIPPED | OUTPATIENT
Start: 2025-06-06

## 2025-06-11 ENCOUNTER — APPOINTMENT (OUTPATIENT)
Dept: HEMATOLOGY/ONCOLOGY | Facility: CLINIC | Age: 65
End: 2025-06-11
Payer: COMMERCIAL

## 2025-06-11 LAB — PSA SERPL-MCNC: 0.37 NG/ML

## 2025-06-19 ENCOUNTER — APPOINTMENT (OUTPATIENT)
Dept: CARDIOLOGY | Facility: CLINIC | Age: 65
End: 2025-06-19
Payer: COMMERCIAL

## 2025-06-19 VITALS
HEIGHT: 69 IN | DIASTOLIC BLOOD PRESSURE: 76 MMHG | OXYGEN SATURATION: 99 % | BODY MASS INDEX: 23.25 KG/M2 | SYSTOLIC BLOOD PRESSURE: 126 MMHG | HEART RATE: 56 BPM | WEIGHT: 157 LBS

## 2025-06-19 DIAGNOSIS — R00.1 SINUS BRADYCARDIA: ICD-10-CM

## 2025-06-19 DIAGNOSIS — I87.1 VENOUS OBSTRUCTION: ICD-10-CM

## 2025-06-19 DIAGNOSIS — Z95.5 PRESENCE OF STENT IN CORONARY ARTERY: ICD-10-CM

## 2025-06-19 DIAGNOSIS — E78.5 HYPERLIPIDEMIA, UNSPECIFIED HYPERLIPIDEMIA TYPE: ICD-10-CM

## 2025-06-19 DIAGNOSIS — I25.10 CORONARY ARTERY DISEASE INVOLVING NATIVE CORONARY ARTERY OF NATIVE HEART WITHOUT ANGINA PECTORIS: ICD-10-CM

## 2025-06-19 DIAGNOSIS — I10 BENIGN HYPERTENSION: Primary | ICD-10-CM

## 2025-06-19 PROBLEM — R07.89 CHEST TIGHTNESS: Status: RESOLVED | Noted: 2023-09-14 | Resolved: 2025-06-19

## 2025-06-19 LAB
ATRIAL RATE: 46 BPM
P OFFSET: 171 MS
P ONSET: 122 MS
PR INTERVAL: 180 MS
Q ONSET: 212 MS
QRS COUNT: 7 BEATS
QRS DURATION: 92 MS
QT INTERVAL: 472 MS
QTC CALCULATION(BAZETT): 413 MS
QTC FREDERICIA: 431 MS
R AXIS: -9 DEGREES
T AXIS: 34 DEGREES
T OFFSET: 448 MS
VENTRICULAR RATE: 46 BPM

## 2025-06-19 PROCEDURE — 3008F BODY MASS INDEX DOCD: CPT | Performed by: INTERNAL MEDICINE

## 2025-06-19 PROCEDURE — 3074F SYST BP LT 130 MM HG: CPT | Performed by: INTERNAL MEDICINE

## 2025-06-19 PROCEDURE — 4010F ACE/ARB THERAPY RXD/TAKEN: CPT | Performed by: INTERNAL MEDICINE

## 2025-06-19 PROCEDURE — 93005 ELECTROCARDIOGRAM TRACING: CPT | Performed by: INTERNAL MEDICINE

## 2025-06-19 PROCEDURE — 99213 OFFICE O/P EST LOW 20 MIN: CPT | Performed by: INTERNAL MEDICINE

## 2025-06-19 PROCEDURE — 1036F TOBACCO NON-USER: CPT | Performed by: INTERNAL MEDICINE

## 2025-06-19 PROCEDURE — 3078F DIAST BP <80 MM HG: CPT | Performed by: INTERNAL MEDICINE

## 2025-06-19 PROCEDURE — 99213 OFFICE O/P EST LOW 20 MIN: CPT

## 2025-06-19 ASSESSMENT — ENCOUNTER SYMPTOMS
LIGHT-HEADEDNESS: 1
SLEEP DISTURBANCES DUE TO BREATHING: 1

## 2025-06-19 NOTE — PROGRESS NOTES
"Subjective   Dlefino Newberry is a 64 y.o. male.    Chief Complaint:  Follow-up coronary artery disease.    HPI    He has had occasional episodes of light headedness.  He has noted low heart rates particularly in the past week.  No syncopal events.  No chest discomfort or chest pressure.  Tolerating medications well.  Has had no recent neurologic changes.    Since 2019 he has developed multiple medical problems. He developed normal pressure hydrocephalus and also has developed symptoms of Parkinson's disease. He has been followed by the neurology service and by the neurosurgery service.      His cardiac history is significant for stenting of the anterior descending coronary artery and of the diagonal branch with a complex PCI performed in 2005. At that time, his left ventricular function was reported to be normal.     Other medical problems including history of hypertension and hyperlipidemia.      Has had a severe reaction to Plavix.     Allergies     Allergy to Naprosyn and to the Plavix.     Family History  Mother    · Family history of coronary artery disease (V17.3) (Z82.49)  Father    · Family history of cerebrovascular accident (CVA) (V17.1) (Z82.3)     Social History  Problems    · Does not use illicit drugs (V49.89) (Z78.9)   · Never a smoker   · Occasional alcohol use    Review of Systems   Respiratory:  Positive for sleep disturbances due to breathing.    Neurological:  Positive for light-headedness.       Current Medications[1]     Visit Vitals  /76 (BP Location: Right arm)   Pulse 56   Ht 1.753 m (5' 9\")   Wt 71.2 kg (157 lb)   SpO2 99%   BMI 23.18 kg/m²   Smoking Status Never   BSA 1.86 m²        Objective     Constitutional:       Appearance: Not in distress.   Neck:      Vascular: JVD normal.   Pulmonary:      Breath sounds: Normal breath sounds.   Cardiovascular:      Bradycardia present. Regular rhythm. S1 with normal intensity. S2 with normal intensity.       Murmurs: There is no murmur.      " No gallop.    Pulses:     Intact distal pulses.   Edema:     Peripheral edema absent.   Abdominal:      General: Bowel sounds are normal.   Neurological:      Mental Status: Alert and oriented to person, place and time.         Lab Review:   Lab Results   Component Value Date     03/12/2025    K 3.9 03/12/2025     03/12/2025    CO2 27 03/12/2025    BUN 23 03/12/2025    CREATININE 1.36 (H) 03/12/2025    GLUCOSE 86 03/12/2025    CALCIUM 9.0 03/12/2025     Lab Results   Component Value Date    WBC 4.2 (L) 03/12/2025    HGB 12.6 (L) 03/12/2025    HCT 37.3 (L) 03/12/2025    MCV 94 03/12/2025     03/12/2025     Lab Results   Component Value Date    CHOL 100 02/24/2024    TRIG 46 02/24/2024    HDL 38.4 02/24/2024       Assessment:    1.  Coronary disease.  Remains asymptomatic.  Works full-time and gets no anginal symptoms.  Is very active.    2.  Bradycardia.  Will discontinue metoprolol.  If he has not had any improved but we will reevaluate.  Always concerned about the potential for neurologic issues causing the bradycardia.    3.  Hyperlipidemia.  LDL is 52.         [1]   Current Outpatient Medications   Medication Sig Dispense Refill    amantadine HCL (Gocovri) 137 mg capsule,extended release 24hr Take 2 caps at bedtime 60 capsule 11    aspirin 81 mg EC tablet Take 1 tablet (81 mg) by mouth once daily.      atorvastatin (Lipitor) 20 mg tablet TAKE ONE TABLET BY MOUTH EVERY DAY 90 tablet 3    carbidopa-levodopa (Rytary) 23.75-95 mg capsule Take 3 capsules by mouth 3 times a day. 810 capsule 3    celecoxib (CeleBREX) 200 mg capsule TAKE 1 CAPSULE BY MOUTH TWICE A DAY (Patient taking differently: Take 1 capsule (200 mg) by mouth 2 times a day. As needed) 60 capsule 11    cyanocobalamin (Vitamin B-12) 1,000 mcg/mL injection Take per directed      folic acid (Folvite) 1 mg tablet TAKE 1 TABLET BY MOUTH ONCE DAILY. 90 tablet 3    losartan (Cozaar) 50 mg tablet TAKE ONE TABLET BY MOUTH DAILY. 90 tablet 3     ondansetron ODT (Zofran-ODT) 4 mg disintegrating tablet Dissolve 1 tablet (4 mg) in the mouth every 8 hours if needed for nausea or vomiting.      pregabalin (Lyrica) 75 mg capsule TAKE 1 CAPSULE (75 MG) BY MOUTH 2 TIMES A DAY. TAKE PER DIRECTED 60 capsule 1    valACYclovir (Valtrex) 500 mg tablet TAKE 1 TABLET (500 MG) BY MOUTH IF NEEDED (BLISTERS). 40 tablet 2    valACYclovir (Valtrex) 500 mg tablet Take 1 tablet (500 mg) by mouth if needed (blisters). 40 tablet 2    mirtazapine (Remeron) 7.5 mg tablet Take 1 tablet (7.5 mg) by mouth once daily at bedtime. (Patient not taking: Reported on 6/19/2025) 90 tablet 0    traZODone (Desyrel) 100 mg tablet Take 3 tablets (300 mg) by mouth as needed at bedtime for sleep. 360 tablet 1     No current facility-administered medications for this visit.

## 2025-07-03 DIAGNOSIS — I25.10 CORONARY ARTERY DISEASE, UNSPECIFIED VESSEL OR LESION TYPE, UNSPECIFIED WHETHER ANGINA PRESENT, UNSPECIFIED WHETHER NATIVE OR TRANSPLANTED HEART: ICD-10-CM

## 2025-07-03 RX ORDER — ATORVASTATIN CALCIUM 20 MG/1
20 TABLET, FILM COATED ORAL DAILY
Qty: 90 TABLET | Refills: 3 | Status: SHIPPED | OUTPATIENT
Start: 2025-07-03

## 2025-07-29 DIAGNOSIS — G20.A1 PARKINSON'S DISEASE WITHOUT DYSKINESIA, UNSPECIFIED WHETHER MANIFESTATIONS FLUCTUATE: ICD-10-CM

## 2025-07-30 RX ORDER — AMANTADINE 137 MG/1
CAPSULE, COATED PELLETS ORAL
Qty: 60 CAPSULE | Refills: 11 | Status: SHIPPED | OUTPATIENT
Start: 2025-07-30

## 2025-08-07 DIAGNOSIS — F51.01 PRIMARY INSOMNIA: ICD-10-CM

## 2025-08-07 RX ORDER — TRAZODONE HYDROCHLORIDE 100 MG/1
300 TABLET ORAL NIGHTLY PRN
Qty: 270 TABLET | Refills: 1 | Status: SHIPPED | OUTPATIENT
Start: 2025-08-07 | End: 2025-12-05

## 2025-08-28 ENCOUNTER — APPOINTMENT (OUTPATIENT)
Facility: CLINIC | Age: 65
End: 2025-08-28
Payer: COMMERCIAL

## 2025-08-28 VITALS
BODY MASS INDEX: 24.14 KG/M2 | RESPIRATION RATE: 18 BRPM | OXYGEN SATURATION: 93 % | HEART RATE: 86 BPM | SYSTOLIC BLOOD PRESSURE: 126 MMHG | HEIGHT: 69 IN | DIASTOLIC BLOOD PRESSURE: 73 MMHG | WEIGHT: 163 LBS

## 2025-08-28 DIAGNOSIS — G47.33 OSA (OBSTRUCTIVE SLEEP APNEA): Primary | ICD-10-CM

## 2025-08-28 DIAGNOSIS — I10 BENIGN HYPERTENSION: ICD-10-CM

## 2025-08-28 DIAGNOSIS — G20.C PARKINSONISM, UNSPECIFIED PARKINSONISM TYPE (MULTI): ICD-10-CM

## 2025-08-28 DIAGNOSIS — G47.09 OTHER INSOMNIA: ICD-10-CM

## 2025-08-28 PROCEDURE — G8433 SCR FOR DEP NOT CPT DOC RSN: HCPCS | Performed by: NURSE PRACTITIONER

## 2025-08-28 PROCEDURE — 99214 OFFICE O/P EST MOD 30 MIN: CPT | Performed by: NURSE PRACTITIONER

## 2025-08-28 PROCEDURE — 3008F BODY MASS INDEX DOCD: CPT | Performed by: NURSE PRACTITIONER

## 2025-08-28 PROCEDURE — 3074F SYST BP LT 130 MM HG: CPT | Performed by: NURSE PRACTITIONER

## 2025-08-28 PROCEDURE — 3078F DIAST BP <80 MM HG: CPT | Performed by: NURSE PRACTITIONER

## 2025-08-28 PROCEDURE — 4010F ACE/ARB THERAPY RXD/TAKEN: CPT | Performed by: NURSE PRACTITIONER

## 2025-08-28 PROCEDURE — 1036F TOBACCO NON-USER: CPT | Performed by: NURSE PRACTITIONER

## 2025-08-28 ASSESSMENT — SLEEP AND FATIGUE QUESTIONNAIRES
HOW LIKELY ARE YOU TO NOD OFF OR FALL ASLEEP WHILE WATCHING TV: SLIGHT CHANCE OF DOZING
HOW LIKELY ARE YOU TO NOD OFF OR FALL ASLEEP IN A CAR, WHILE STOPPED FOR A FEW MINUTES IN TRAFFIC: WOULD NEVER DOZE
HOW LIKELY ARE YOU TO NOD OFF OR FALL ASLEEP WHILE LYING DOWN TO REST IN THE AFTERNOON WHEN CIRCUMSTANCES PERMIT: SLIGHT CHANCE OF DOZING
ESS-CHAD TOTAL SCORE: 3
SITING INACTIVE IN A PUBLIC PLACE LIKE A CLASS ROOM OR A MOVIE THEATER: WOULD NEVER DOZE
HOW LIKELY ARE YOU TO NOD OFF OR FALL ASLEEP WHEN YOU ARE A PASSENGER IN A CAR FOR AN HOUR WITHOUT A BREAK: WOULD NEVER DOZE
HOW LIKELY ARE YOU TO NOD OFF OR FALL ASLEEP WHILE SITTING AND READING: SLIGHT CHANCE OF DOZING
HOW LIKELY ARE YOU TO NOD OFF OR FALL ASLEEP WHILE SITTING QUIETLY AFTER LUNCH WITHOUT ALCOHOL: WOULD NEVER DOZE
HOW LIKELY ARE YOU TO NOD OFF OR FALL ASLEEP WHILE SITTING AND TALKING TO SOMEONE: WOULD NEVER DOZE

## 2025-08-28 ASSESSMENT — ENCOUNTER SYMPTOMS
LOSS OF SENSATION IN FEET: 1
OCCASIONAL FEELINGS OF UNSTEADINESS: 0
DEPRESSION: 0

## 2025-08-28 ASSESSMENT — COLUMBIA-SUICIDE SEVERITY RATING SCALE - C-SSRS
6. HAVE YOU EVER DONE ANYTHING, STARTED TO DO ANYTHING, OR PREPARED TO DO ANYTHING TO END YOUR LIFE?: NO
2. HAVE YOU ACTUALLY HAD ANY THOUGHTS OF KILLING YOURSELF?: NO
1. IN THE PAST MONTH, HAVE YOU WISHED YOU WERE DEAD OR WISHED YOU COULD GO TO SLEEP AND NOT WAKE UP?: NO

## 2025-09-30 ENCOUNTER — APPOINTMENT (OUTPATIENT)
Dept: NEUROLOGY | Facility: CLINIC | Age: 65
End: 2025-09-30
Payer: COMMERCIAL

## 2025-12-18 ENCOUNTER — APPOINTMENT (OUTPATIENT)
Facility: CLINIC | Age: 65
End: 2025-12-18
Payer: COMMERCIAL

## 2026-01-05 ENCOUNTER — APPOINTMENT (OUTPATIENT)
Age: 66
End: 2026-01-05
Payer: COMMERCIAL

## 2026-06-25 ENCOUNTER — APPOINTMENT (OUTPATIENT)
Dept: CARDIOLOGY | Facility: CLINIC | Age: 66
End: 2026-06-25
Payer: COMMERCIAL